# Patient Record
Sex: FEMALE | Race: WHITE | NOT HISPANIC OR LATINO | Employment: UNEMPLOYED | RURAL
[De-identification: names, ages, dates, MRNs, and addresses within clinical notes are randomized per-mention and may not be internally consistent; named-entity substitution may affect disease eponyms.]

---

## 2017-08-23 ENCOUNTER — OFFICE VISIT (OUTPATIENT)
Dept: RETAIL CLINIC | Facility: CLINIC | Age: 37
End: 2017-08-23

## 2017-08-23 VITALS
HEIGHT: 63 IN | WEIGHT: 74 LBS | OXYGEN SATURATION: 96 % | HEART RATE: 91 BPM | TEMPERATURE: 97.9 F | BODY MASS INDEX: 13.11 KG/M2 | RESPIRATION RATE: 18 BRPM

## 2017-08-23 DIAGNOSIS — J40 BRONCHITIS: Primary | ICD-10-CM

## 2017-08-23 DIAGNOSIS — J01.00 ACUTE NON-RECURRENT MAXILLARY SINUSITIS: ICD-10-CM

## 2017-08-23 PROCEDURE — 99213 OFFICE O/P EST LOW 20 MIN: CPT | Performed by: NURSE PRACTITIONER

## 2017-08-23 RX ORDER — PREDNISONE 10 MG/1
TABLET ORAL
Qty: 21 TABLET | Refills: 0 | Status: SHIPPED | OUTPATIENT
Start: 2017-08-23 | End: 2018-02-14

## 2017-08-23 RX ORDER — LEVALBUTEROL TARTRATE 45 UG/1
1-2 AEROSOL, METERED ORAL EVERY 4 HOURS PRN
Qty: 1 INHALER | Refills: 0 | Status: SHIPPED | OUTPATIENT
Start: 2017-08-23 | End: 2017-09-22

## 2017-08-23 RX ORDER — CETIRIZINE HYDROCHLORIDE 5 MG/1
5 TABLET ORAL DAILY
COMMUNITY
End: 2018-02-14

## 2017-08-23 RX ORDER — AZITHROMYCIN 250 MG/1
TABLET, FILM COATED ORAL
Qty: 6 TABLET | Refills: 0 | Status: SHIPPED | OUTPATIENT
Start: 2017-08-23 | End: 2017-11-02

## 2017-08-23 RX ORDER — LEVALBUTEROL INHALATION SOLUTION 1.25 MG/3ML
1 SOLUTION RESPIRATORY (INHALATION) EVERY 4 HOURS PRN
Qty: 60 AMPULE | Refills: 0 | Status: SHIPPED | OUTPATIENT
Start: 2017-08-23 | End: 2017-09-22

## 2017-08-23 NOTE — PROGRESS NOTES
Subjective   Leah Fuchs is a 36 y.o. female.     Sinusitis   This is a new problem. The current episode started in the past 7 days. The problem has been gradually worsening since onset. Maximum temperature: low grade. The pain is mild. Associated symptoms include chills, congestion, coughing, shortness of breath and sinus pressure. Pertinent negatives include no diaphoresis, ear pain, headaches, hoarse voice, neck pain, sneezing, sore throat or swollen glands.   Cough   Associated symptoms include chills and shortness of breath. Pertinent negatives include no ear pain, headaches or sore throat.    Pt has a history of Interstitial Restrictive Lung disease from a child, HX of multiple hospitalization.     The following portions of the patient's history were reviewed and updated as appropriate: allergies, current medications, past family history, past medical history, past social history, past surgical history and problem list.    Review of Systems   Constitutional: Positive for chills. Negative for diaphoresis.   HENT: Positive for congestion and sinus pressure. Negative for ear pain, hoarse voice, sneezing and sore throat.    Respiratory: Positive for cough and shortness of breath.    Musculoskeletal: Negative for neck pain.   Neurological: Negative for headaches.       Objective   Physical Exam   Constitutional:  Non-toxic appearance. She has a sickly appearance.   Pt severely underweight    HENT:   Head: Normocephalic and atraumatic.   Cardiovascular: Regular rhythm and normal heart sounds.    Pulmonary/Chest: Effort normal. She has wheezes. She has rhonchi. She has no rales.   Lymphadenopathy:     She has no cervical adenopathy.   Skin: Skin is warm and dry.       Assessment/Plan   Leah was seen today for sinusitis and cough.    Diagnoses and all orders for this visit:    Bronchitis    Acute non-recurrent maxillary sinusitis    Other orders  -     azithromycin (ZITHROMAX Z-LILY) 250 MG tablet; Take 2  tablets the first day, then 1 tablet daily for 4 days.  -     predniSONE (DELTASONE) 10 MG tablet; 6/5/4/3/2/1 As directed PO  -     levalbuterol (XOPENEX) 1.25 MG/3ML nebulizer solution; Take 1 ampule by nebulization Every 4 (Four) Hours As Needed for Wheezing for up to 30 days.  -     levalbuterol (XOPENEX HFA) 45 MCG/ACT inhaler; Inhale 1-2 puffs Every 4 (Four) Hours As Needed for Wheezing for up to 30 days.

## 2017-08-29 ENCOUNTER — OFFICE VISIT (OUTPATIENT)
Dept: PULMONOLOGY | Facility: CLINIC | Age: 37
End: 2017-08-29

## 2017-08-29 VITALS
SYSTOLIC BLOOD PRESSURE: 110 MMHG | BODY MASS INDEX: 14.39 KG/M2 | OXYGEN SATURATION: 92 % | WEIGHT: 78.2 LBS | HEIGHT: 62 IN | DIASTOLIC BLOOD PRESSURE: 70 MMHG | RESPIRATION RATE: 16 BRPM | TEMPERATURE: 98.6 F | HEART RATE: 121 BPM

## 2017-08-29 DIAGNOSIS — J84.9 INTERSTITIAL LUNG DISEASE (HCC): Primary | ICD-10-CM

## 2017-08-29 PROCEDURE — 99215 OFFICE O/P EST HI 40 MIN: CPT | Performed by: NURSE PRACTITIONER

## 2017-08-29 PROCEDURE — 94729 DIFFUSING CAPACITY: CPT | Performed by: NURSE PRACTITIONER

## 2017-08-29 PROCEDURE — 71020 CHG CHEST X-RAY 2 VW: CPT | Performed by: NURSE PRACTITIONER

## 2017-08-29 PROCEDURE — 94726 PLETHYSMOGRAPHY LUNG VOLUMES: CPT | Performed by: NURSE PRACTITIONER

## 2017-08-29 PROCEDURE — 94375 RESPIRATORY FLOW VOLUME LOOP: CPT | Performed by: NURSE PRACTITIONER

## 2017-08-29 PROCEDURE — 94200 LUNG FUNCTION TEST (MBC/MVV): CPT | Performed by: NURSE PRACTITIONER

## 2017-08-29 RX ORDER — AZITHROMYCIN 250 MG/1
TABLET, FILM COATED ORAL
Qty: 11 TABLET | Refills: 2 | Status: SHIPPED | OUTPATIENT
Start: 2017-08-29 | End: 2017-11-02

## 2017-08-29 RX ORDER — LEVALBUTEROL TARTRATE 45 UG/1
2 AEROSOL, METERED ORAL 4 TIMES DAILY PRN
Qty: 15 G | Refills: 11 | Status: SHIPPED | OUTPATIENT
Start: 2017-08-29 | End: 2021-12-09 | Stop reason: ALTCHOICE

## 2017-08-29 RX ORDER — PREDNISONE 20 MG/1
TABLET ORAL
Qty: 18 TABLET | Refills: 1 | Status: SHIPPED | OUTPATIENT
Start: 2017-08-29 | End: 2018-02-14

## 2017-08-29 RX ORDER — LEVOCETIRIZINE DIHYDROCHLORIDE 5 MG/1
TABLET, FILM COATED ORAL
Qty: 30 TABLET | Refills: 10 | Status: SHIPPED | OUTPATIENT
Start: 2017-08-29

## 2017-08-29 RX ORDER — KETOROLAC TROMETHAMINE 10 MG/1
TABLET, FILM COATED ORAL
Qty: 6 TABLET | Refills: 0 | Status: SHIPPED | OUTPATIENT
Start: 2017-08-29 | End: 2018-02-14

## 2017-08-29 NOTE — PROGRESS NOTES
[facility], [place], operated by [facility] Follow up    CHIEF COMPLAINT    Established new patient here for chest pain    HISTORY OF PRESENT ILLNESS    Leah Fuchs is a 36 y.o.female here today for follow-up given her history of tobacco abuse, bronchiectasis, and biopsy-proven BOOP in 2007.  I last saw her in the office 3/27/15.  At that time she was smoking a pack a cigarettes daily.  She has a history of bipolar disorder, supraventricular tachycardia, and frequent exacerbations.    She has a history of chest wall pain as well, usually in the left lung.  She was referred back to our office after she went to her local [facility], she was given prednisone and Z-Vishal for cough wheezing and congestion, the prednisone was only for about 6 days and though she feels a little bit better she hasn't fully recovered.    Also she did not have chest pains by her report and was doing well from that standpoint until she had to do breathing tests in our office today.    Over the past couple years she has had no hemoptysis, she has had frequent exacerbations and continues to smoke about a pack a day.  She has a nebulizer machine at home but is only able to use Atrovent, due to her history of tachycardia.  She is able to tolerate Xopenex HFA and this helped significantly with her breathing; albuterol has caused significant tachycardia in the past.    She has a history of pulmonary cachexia weighing as low as 82 pounds, by her report she get up to close to 100 pounds over the past year but her weight is back down again.  She denies abdominal pain or dysphagia.  No nausea vomiting.    She has nasal drainage and will use over-the-counter Claritin or Zyrtec but thinks that xyzal works the best though its expensive to purchase.    She denies hemoptysis, she's had coughing wheezing with tan sputum but it's very hard to expectorate, currently she can't really cough anything up.    She continues on oxygen at night.    Active problems:  Tobacco  abuse  Emphysema/COPD  Pulmonary cachexia  Bronchiolitis obliterans  Bipolar disorder  Bronchospasm  Allergic rhinitis    Allergies   Allergen Reactions   • Avelox [Moxifloxacin Hcl In Nacl] Shortness Of Breath     IV   • Tetracyclines & Related Shortness Of Breath   • Benadryl [Diphenhydramine] Hives   • Demerol [Meperidine] Hives   • Tessalon [Benzonatate]        Current Outpatient Prescriptions:   •  azithromycin (ZITHROMAX Z-LILY) 250 MG tablet, Take 2 tablets the first day, then 1 tablet daily for 4 days., Disp: 6 tablet, Rfl: 0  •  cetirizine (zyrTEC) 5 MG tablet, Take 5 mg by mouth Daily., Disp: , Rfl:   •  levalbuterol (XOPENEX HFA) 45 MCG/ACT inhaler, Inhale 1-2 puffs Every 4 (Four) Hours As Needed for Wheezing for up to 30 days., Disp: 1 inhaler, Rfl: 0  •  levalbuterol (XOPENEX) 1.25 MG/3ML nebulizer solution, Take 1 ampule by nebulization Every 4 (Four) Hours As Needed for Wheezing for up to 30 days., Disp: 60 ampule, Rfl: 0  •  predniSONE (DELTASONE) 10 MG tablet, 6/5/4/3/2/1 As directed PO, Disp: 21 tablet, Rfl: 0  MEDICATION LIST AND ALLERGIES REVIEWED.    Social History   Substance Use Topics   • Smoking status: Current Every Day Smoker     Types: Cigarettes   • Smokeless tobacco: Not on file   • Alcohol use Not on file       FAMILY AND SOCIAL HISTORY REVIEWED.    Review of Systems   Constitutional: Negative for chills, fatigue, fever and unexpected weight change.   HENT: Positive for congestion and postnasal drip. Negative for nosebleeds, sore throat and trouble swallowing.    Eyes: Negative.  Negative for pain and redness.   Respiratory: Positive for cough, chest tightness, shortness of breath and wheezing. Negative for stridor.    Cardiovascular: Positive for chest pain. Negative for palpitations and leg swelling.   Gastrointestinal: Negative for abdominal distention, abdominal pain and nausea.   Endocrine: Negative for cold intolerance and heat intolerance.   Genitourinary: Negative for difficulty  "urinating, dysuria, flank pain and hematuria.   Musculoskeletal: Negative for arthralgias and myalgias.   Skin: Negative for color change and rash.   Neurological: Negative for dizziness, syncope and numbness.   Hematological: Negative for adenopathy. Does not bruise/bleed easily.   Psychiatric/Behavioral: Negative for agitation, behavioral problems, confusion and sleep disturbance.   .    /70  Pulse (!) 121  Temp 98.6 °F (37 °C)  Resp 16  Ht 62\" (157.5 cm)  Wt 78 lb 3.2 oz (35.5 kg)  LMP  (LMP Unknown)  SpO2 92% Comment: RA  BMI 14.3 kg/m2  Physical Exam   Constitutional: She is oriented to person, place, and time. Vital signs are normal. She appears well-developed. She is cooperative.  Non-toxic appearance. No distress.   HENT:   Head: Normocephalic. Head is without abrasion and without contusion.   Mouth/Throat: Oropharynx is clear and moist and mucous membranes are normal.   Eyes: Conjunctivae are normal. Pupils are equal, round, and reactive to light.   Neck: Trachea normal and normal range of motion. No JVD present. No tracheal deviation present. No thyroid mass and no thyromegaly present.   Cardiovascular: Normal rate, regular rhythm and normal heart sounds.  Exam reveals no gallop.    No murmur heard.  No edema cyanosis or calf tenderness,   Pulmonary/Chest: Effort normal. No stridor. No respiratory distress. She has wheezes. She has no rales. She exhibits tenderness.   Wheezing and rhonchi throughout   Abdominal: Soft. She exhibits no ascites. There is no hepatosplenomegaly. There is no tenderness.   Lymphadenopathy:        Head (right side): No submandibular adenopathy present.        Head (left side): No submandibular adenopathy present.     She has no cervical adenopathy.        Right: No supraclavicular adenopathy present.        Left: No supraclavicular adenopathy present.   Neurological: She is alert and oriented to person, place, and time. She has normal strength.   Skin: Skin is warm " and dry. No abrasion and no rash noted.   Psychiatric: She has a normal mood and affect. Her speech is normal and behavior is normal. Cognition and memory are normal.       RESULTS    Chest x-ray PA and lateral obtained the office today reveals scattered reticular nodular markings, they appear slightly increased from 2014; though similar to film in our office in 2015.  No effusions, lungs hyperexpanded.    She really was not able to do pulmonary function testing today because of the chest wall pain    PROBLEM LIST    COPD exacerbation  --With bronchiectasis, interstitial lung disease, emphysema  --Chronic bronchitis    Chest wall pain, anterior left upper lobe    Allergic rhinitis    BOOP    Tobacco abuse    Intolerance to albuterol secondary to tachycardia  --Can only tolerate Atrovent nebulizers and Xopenex HFA      DISCUSSION    Prednisone taper for the wheezing and congestion    Azithromycin for the wheezing and congestion    Toradol 10 mg by mouth every 6 hours ×6 doses for the chest wall pain    She needs a nebulizer machine for her history of chronic bronchitis, will continue with the Atrovent nebs 4 times a day    We'll prior authorize if we need to the Xopenex inhaler as she has significant tachycardia and is unable to tolerate albuterol.  She was told to call extension [phone] if she has any difficulty getting this prior authorized    Continue on the oxygen at night    We discussed smoking cessation strategies, smoking cessation aids, and eventual long-term loss of lung function.  There were admittedly she seems completely uninterested in this    At some point we'll probably want to repeat a CT scan of the chest but we'll reassess that upon her return    Follow-up in about 6 weeks, we don't have to do pulmonary function testing at this time but I told her at some point point have to at least attempt spirometry again    Moraima Barry, DAMIAN  08/29/201711:06 AM  Electronically signed     Please note that  portions of this note were completed with a voice recognition program. Efforts were made to edit the dictations, but occasionally words are mistranscribed.      CC: Andrzej Mathew MD

## 2017-10-18 RX ORDER — PREDNISONE 20 MG/1
TABLET ORAL
Qty: 18 TABLET | OUTPATIENT
Start: 2017-10-18

## 2017-11-02 ENCOUNTER — OFFICE VISIT (OUTPATIENT)
Dept: PULMONOLOGY | Facility: CLINIC | Age: 37
End: 2017-11-02

## 2017-11-02 VITALS
HEIGHT: 62 IN | OXYGEN SATURATION: 90 % | TEMPERATURE: 98.1 F | RESPIRATION RATE: 16 BRPM | HEART RATE: 123 BPM | BODY MASS INDEX: 16.93 KG/M2 | DIASTOLIC BLOOD PRESSURE: 78 MMHG | SYSTOLIC BLOOD PRESSURE: 112 MMHG | WEIGHT: 92 LBS

## 2017-11-02 DIAGNOSIS — J47.9 BRONCHIECTASIS WITHOUT COMPLICATION (HCC): ICD-10-CM

## 2017-11-02 DIAGNOSIS — J43.1 PANLOBULAR EMPHYSEMA (HCC): ICD-10-CM

## 2017-11-02 DIAGNOSIS — J84.89 BOOP (BRONCHIOLITIS OBLITERANS WITH ORGANIZING PNEUMONIA) (HCC): ICD-10-CM

## 2017-11-02 DIAGNOSIS — J45.40 MODERATE PERSISTENT ASTHMA, UNSPECIFIED WHETHER COMPLICATED: Primary | ICD-10-CM

## 2017-11-02 DIAGNOSIS — Z72.0 TOBACCO USE: ICD-10-CM

## 2017-11-02 PROCEDURE — 99214 OFFICE O/P EST MOD 30 MIN: CPT | Performed by: NURSE PRACTITIONER

## 2017-11-02 RX ORDER — DOXEPIN HYDROCHLORIDE 150 MG/1
CAPSULE ORAL
Refills: 1 | COMMUNITY
Start: 2017-10-02 | End: 2018-02-14 | Stop reason: SDDI

## 2017-11-02 RX ORDER — LURASIDONE HYDROCHLORIDE 40 MG/1
TABLET, FILM COATED ORAL
Refills: 1 | COMMUNITY
Start: 2017-10-02 | End: 2018-02-14 | Stop reason: ALTCHOICE

## 2017-11-03 PROBLEM — Z72.0 TOBACCO USE: Status: ACTIVE | Noted: 2017-11-03

## 2017-11-03 PROBLEM — J47.9 BRONCHIECTASIS WITHOUT COMPLICATION (HCC): Status: ACTIVE | Noted: 2017-11-03

## 2017-11-03 PROBLEM — J43.1 PANLOBULAR EMPHYSEMA (HCC): Status: ACTIVE | Noted: 2017-11-03

## 2017-11-03 PROBLEM — J84.89 BOOP (BRONCHIOLITIS OBLITERANS WITH ORGANIZING PNEUMONIA): Status: ACTIVE | Noted: 2017-11-03

## 2017-11-03 PROBLEM — J45.909 ASTHMA: Status: ACTIVE | Noted: 2017-11-03

## 2017-11-03 PROBLEM — F31.9 BIPOLAR DISORDER (HCC): Status: ACTIVE | Noted: 2017-11-03

## 2017-11-03 NOTE — PROGRESS NOTES
[facility] Follow up    CHIEF COMPLAINT    COPD    HISTORY OF PRESENT ILLNESS    Leah Fuchs is a 37 y.o.female here today for follow up on her moderate persistent asthma with COPD, bronchiectasis and history of biopsy-proven blue in 2007.  She's been followed in the office by Mrs. Barry in 2015 and then once again in August 2017.  She has continued on tobacco use of more than 1 pack a day.  She has chronic recurrent exacerbations and use of prednisone and antibiotics mostly at the urgent treatment center.  She has frequent exacerbations.  She has a nebulizer machine to use his home with Atrovent as well as a Xopenex inhaler.  She is intolerant to albuterol secondary to tachycardia.  She does have oxygen at home but doesn't wear it.  In the office today she was hypoxic and tachycardic.  She also has pulmonary cachexia with her weight usually around  pounds.  She's 92 today.  She denies any nausea vomiting or abdominal pain.    She is complaining a prednisone taper currently for an exacerbation.  She is only using her Atrovent nebulizers about once a week.  She does use her Xopenex inhaler regularly.    She does follow-up with psychiatry for her bipolar disorder.  She's had some medication changes lately that she's felt has caused some tachycardia.    Patient Active Problem List   Diagnosis   • Asthma   • Bronchiectasis without complication   • BOOP (bronchiolitis obliterans with organizing pneumonia)   • Tobacco use   • Panlobular emphysema   • Bipolar disorder       Allergies   Allergen Reactions   • Avelox [Moxifloxacin Hcl In Nacl] Shortness Of Breath     IV   • Tetracyclines & Related Shortness Of Breath   • Benadryl [Diphenhydramine] Hives   • Demerol [Meperidine] Hives   • Tessalon [Benzonatate]        Current Outpatient Prescriptions:   •  cetirizine (zyrTEC) 5 MG tablet, Take 5 mg by mouth Daily., Disp: , Rfl:   •  doxepin (SINEquan) 150 MG capsule, Take 1 capsule by mouth at bedtime,  "Disp: , Rfl: 1  •  ipratropium (ATROVENT) 0.02 % nebulizer solution, 1 vial QID for sob, Disp: 30 mL, Rfl: 11  •  ketorolac (TORADOL) 10 MG tablet, Take 1 every 6 hours x 6 doses, Disp: 6 tablet, Rfl: 0  •  LATUDA 40 MG tablet tablet, Take 1 tablet by mouth once a day, Disp: , Rfl: 1  •  levalbuterol (XOPENEX HFA) 45 MCG/ACT inhaler, Inhale 2 puffs 4 (Four) Times a Day As Needed for Wheezing., Disp: 15 g, Rfl: 11  •  levocetirizine (XYZAL) 5 MG tablet, Take 1 each day, Disp: 30 tablet, Rfl: 10  •  predniSONE (DELTASONE) 10 MG tablet, 6/5/4/3/2/1 As directed PO, Disp: 21 tablet, Rfl: 0  •  predniSONE (DELTASONE) 20 MG tablet, 2 PILLS DAILY X 4 DAYS, THEN 1 DAILY X 10 DAYS, Disp: 18 tablet, Rfl: 1  MEDICATION LIST AND ALLERGIES REVIEWED.    Social History   Substance Use Topics   • Smoking status: Current Every Day Smoker     Types: Cigarettes   • Smokeless tobacco: None   • Alcohol use None       FAMILY AND SOCIAL HISTORY REVIEWED.    Review of Systems   Constitutional: Positive for fatigue. Negative for chills, fever and unexpected weight change.   HENT: Negative for congestion, nosebleeds, postnasal drip, rhinorrhea, sinus pressure and trouble swallowing.    Respiratory: Positive for cough, shortness of breath and wheezing. Negative for chest tightness.    Cardiovascular: Negative for chest pain and leg swelling.   Gastrointestinal: Negative for abdominal pain, constipation, diarrhea, nausea and vomiting.   Genitourinary: Negative for dysuria, frequency, hematuria and urgency.   Musculoskeletal: Negative for myalgias.   Neurological: Negative for dizziness, weakness, numbness and headaches.   All other systems reviewed and are negative.  .    /78  Pulse (!) 123  Temp 98.1 °F (36.7 °C)  Resp 16  Ht 62\" (157.5 cm)  Wt 92 lb (41.7 kg)  LMP  (LMP Unknown)  SpO2 90% Comment: RA  BMI 16.83 kg/m2    Physical Exam   Constitutional: She is oriented to person, place, and time. She appears well-developed and " well-nourished.   Cachectic   HENT:   Head: Normocephalic and atraumatic.   Eyes: EOM are normal. Pupils are equal, round, and reactive to light.   Neck: Normal range of motion. Neck supple.   Cardiovascular: Normal rate and regular rhythm.    No murmur heard.  Pulmonary/Chest: Effort normal. No respiratory distress. She has wheezes. She has rales.   Abdominal: Soft. Bowel sounds are normal. She exhibits no distension.   Musculoskeletal: Normal range of motion. She exhibits no edema.   Neurological: She is alert and oriented to person, place, and time.   Skin: Skin is warm and dry. No erythema.   Psychiatric: She has a normal mood and affect. Her behavior is normal.   Vitals reviewed.        RESULTS        PROBLEM LIST    Problem List Items Addressed This Visit        Respiratory    Asthma - Primary    Bronchiectasis without complication    BOOP (bronchiolitis obliterans with organizing pneumonia)    Overview     History of biopsy-proven in 2007         Panlobular emphysema       Other    Tobacco use            DISCUSSION    A total of 20 minutes was spent with face-to-face time in the office today discussing COPD management.  We spent time counseling on tobacco cessation, maintenance medication, the use of oxygen with chronic respiratory failure, patient and family education regarding disease management.  As well as the importance of compliance with medication and regular follow-up.  She is not using the Atrovent nebulizers appropriately to get a good anticholinergic effect.  I asked her if she likes to try a combination medication such as Bevespi or Stiolto.  She is agreeable to try a gave her some samples of Bevespi with a spacer in the office today.  She'll follow up in 6-8 weeks with repeat spirometry to see if it's helped her functionality.    We spent a great deal of time discussing smoking cessation.  I again reiterated that this is the only way that her functionality would improve and not continue to  decline.  Also if she would ever want to be a candidate for transplant or even evaluation she would have to stop smoking first.  We went over smoking cessation aids including patch and gum.  She is already on multiple medications for her anxiety and depression by her psychiatrist.    Overall she does not feel that her functionality has changed much over the last couple years..  I encouraged her to wear her oxygen at night and with activity throughout the day for her hypoxemia.  We went over chronic hypoxic complications including heart failure and pulmonary hypertension.    She does not get a flu shot secondary to reaction in the past.      Samina Castelan, APRN  11/02/20178:18 AM  Electronically signed     Please note that portions of this note were completed with a voice recognition program. Efforts were made to edit the dictations, but occasionally words are mistranscribed.      CC: Andrzej Mathew MD

## 2018-02-14 ENCOUNTER — OFFICE VISIT (OUTPATIENT)
Dept: PULMONOLOGY | Facility: CLINIC | Age: 38
End: 2018-02-14

## 2018-02-14 VITALS
HEART RATE: 129 BPM | HEIGHT: 62 IN | OXYGEN SATURATION: 84 % | SYSTOLIC BLOOD PRESSURE: 90 MMHG | WEIGHT: 100 LBS | BODY MASS INDEX: 18.4 KG/M2 | RESPIRATION RATE: 22 BRPM | TEMPERATURE: 97.3 F | DIASTOLIC BLOOD PRESSURE: 60 MMHG

## 2018-02-14 DIAGNOSIS — R64 PULMONARY CACHEXIA DUE TO COPD (HCC): ICD-10-CM

## 2018-02-14 DIAGNOSIS — J44.9 PULMONARY CACHEXIA DUE TO COPD (HCC): ICD-10-CM

## 2018-02-14 DIAGNOSIS — J47.9 BRONCHIECTASIS WITHOUT COMPLICATION (HCC): ICD-10-CM

## 2018-02-14 DIAGNOSIS — F31.9 BIPOLAR AFFECTIVE DISORDER, REMISSION STATUS UNSPECIFIED (HCC): ICD-10-CM

## 2018-02-14 DIAGNOSIS — J45.40 MODERATE PERSISTENT ASTHMA, UNSPECIFIED WHETHER COMPLICATED: Primary | ICD-10-CM

## 2018-02-14 DIAGNOSIS — J84.89 BOOP (BRONCHIOLITIS OBLITERANS WITH ORGANIZING PNEUMONIA) (HCC): ICD-10-CM

## 2018-02-14 DIAGNOSIS — J43.1 PANLOBULAR EMPHYSEMA (HCC): ICD-10-CM

## 2018-02-14 PROCEDURE — 99214 OFFICE O/P EST MOD 30 MIN: CPT | Performed by: NURSE PRACTITIONER

## 2018-02-14 RX ORDER — LEVOFLOXACIN 500 MG/1
500 TABLET, FILM COATED ORAL DAILY
Qty: 7 TABLET | Refills: 0 | Status: SHIPPED | OUTPATIENT
Start: 2018-02-14 | End: 2018-03-09

## 2018-02-14 RX ORDER — OLANZAPINE 5 MG/1
TABLET, ORALLY DISINTEGRATING ORAL
Refills: 1 | COMMUNITY
Start: 2018-02-05 | End: 2021-12-09 | Stop reason: ALTCHOICE

## 2018-02-14 RX ORDER — PAROXETINE 10 MG/1
TABLET, FILM COATED ORAL
Refills: 1 | COMMUNITY
Start: 2018-02-05 | End: 2018-10-26 | Stop reason: DRUGHIGH

## 2018-02-14 RX ORDER — BUDESONIDE 0.25 MG/2ML
0.25 INHALANT ORAL 2 TIMES DAILY
Qty: 10 EACH | Refills: 0 | Status: SHIPPED | OUTPATIENT
Start: 2018-02-14 | End: 2021-12-09 | Stop reason: SDUPTHER

## 2018-02-14 RX ORDER — ARIPIPRAZOLE 5 MG/1
TABLET ORAL
Refills: 1 | COMMUNITY
Start: 2017-12-27 | End: 2018-02-14 | Stop reason: SDDI

## 2018-02-14 RX ORDER — TRAZODONE HYDROCHLORIDE 50 MG/1
TABLET ORAL
Refills: 1 | COMMUNITY
Start: 2018-01-30 | End: 2019-02-26

## 2018-02-14 RX ORDER — PREDNISONE 10 MG/1
TABLET ORAL
Qty: 31 TABLET | Refills: 0 | Status: SHIPPED | OUTPATIENT
Start: 2018-02-14 | End: 2018-03-09

## 2018-02-14 NOTE — PROGRESS NOTES
[facility], [place], operated by [facility] Follow up    CHIEF COMPLAINT    Cough and congestion    HISTORY OF PRESENT ILLNESS    Leah Fuchs is a 37 y.o.female here today for worsening cough and congestion.  She's had around a week of cough and congestion that rattly but nonproductive in sputum.  She does complain of wheezing and chest tightness.  She complains of generalized malaise and fatigue with decreased appetite.  She has some nausea but no vomiting or diarrhea.  She denies any fevers or chills.  She has been using her Xopenex inhaler.  She does not use her Atrovent nebulizers.  She does have oxygen she wears at night.  She's been complaining of more more dyspnea with activity over the last year as well as monitoring her oxygen saturations at home and they do significantly drop with any activity.  She does continue to smoke 1 pack per day.    She is tried multiple other inhalers in the past including combination medications, which have all caused oral mucosa irritation and sores.  during her last visit I gave her some Bevespi be which caused again mouth sores and irritation.      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       Allergies   Allergen Reactions   • Avelox [Moxifloxacin Hcl In Nacl] Shortness Of Breath     IV   • Tetracyclines & Related Shortness Of Breath   • Benadryl [Diphenhydramine] Hives   • Demerol [Meperidine] Hives   • Tessalon [Benzonatate]        Current Outpatient Prescriptions:   •  levalbuterol (XOPENEX HFA) 45 MCG/ACT inhaler, Inhale 2 puffs 4 (Four) Times a Day As Needed for Wheezing., Disp: 15 g, Rfl: 11  •  levocetirizine (XYZAL) 5 MG tablet, Take 1 each day, Disp: 30 tablet, Rfl: 10  •  OLANZapine zydis (zyPREXA) 5 MG disintegrating tablet, Dissolve 1 tablet on tongue twice a day as needed,  "Disp: , Rfl: 1  •  PARoxetine (PAXIL) 10 MG tablet, Take 1 tablet by mouth once a day, Disp: , Rfl: 1  •  traZODone (DESYREL) 50 MG tablet, Take 1 tablet by mouth at bedtime as needed, Disp: , Rfl: 1  •  budesonide (PULMICORT) 0.25 MG/2ML nebulizer solution, Take 2 mL by nebulization 2 (Two) Times a Day., Disp: 10 each, Rfl: 0  •  levoFLOXacin (LEVAQUIN) 500 MG tablet, Take 1 tablet by mouth Daily., Disp: 7 tablet, Rfl: 0  •  predniSONE (DELTASONE) 10 MG tablet, Take 4 tabs daily x 4 days, then take 3 daily x 3 days, then take 2 daily for 2 days, then take 1 daily x 2 days, Disp: 31 tablet, Rfl: 0  MEDICATION LIST AND ALLERGIES REVIEWED.    Social History   Substance Use Topics   • Smoking status: Current Every Day Smoker     Types: Cigarettes   • Smokeless tobacco: None   • Alcohol use None       FAMILY AND SOCIAL HISTORY REVIEWED.    Review of Systems   Constitutional: Positive for activity change, appetite change and fatigue. Negative for chills, fever and unexpected weight change.   HENT: Positive for congestion. Negative for nosebleeds, postnasal drip, rhinorrhea, sinus pressure and trouble swallowing.    Respiratory: Positive for cough, chest tightness, shortness of breath and wheezing.    Cardiovascular: Negative for chest pain and leg swelling.   Gastrointestinal: Positive for nausea. Negative for abdominal pain, constipation, diarrhea and vomiting.   Genitourinary: Negative for dysuria, frequency, hematuria and urgency.   Musculoskeletal: Negative for arthralgias and myalgias.   Neurological: Negative for dizziness, weakness, numbness and headaches.   All other systems reviewed and are negative.  .    BP 90/60 (BP Location: Left arm, Patient Position: Sitting, Cuff Size: Adult)  Pulse (!) 129  Temp 97.3 °F (36.3 °C)  Resp 22  Ht 157.5 cm (62.01\")  Wt 45.4 kg (100 lb)  LMP  (LMP Unknown)  SpO2 (!) 84%  BMI 18.29 kg/m2  Wt Readings from Last 3 Encounters:   02/14/18 45.4 kg (100 lb)   11/02/17 41.7 kg " (92 lb)   08/29/17 35.5 kg (78 lb 3.2 oz)       Physical Exam   Constitutional: She is oriented to person, place, and time. She appears well-developed and well-nourished.   HENT:   Head: Normocephalic and atraumatic.   Eyes: EOM are normal. Pupils are equal, round, and reactive to light.   Neck: Normal range of motion. Neck supple.   Cardiovascular: Normal rate and regular rhythm.    No murmur heard.  Pulmonary/Chest: Effort normal. No respiratory distress. She has no wheezes. She has rales. She exhibits tenderness.   Abdominal: Soft. Bowel sounds are normal. She exhibits no distension.   Musculoskeletal: Normal range of motion. She exhibits no edema.   Neurological: She is alert and oriented to person, place, and time.   Skin: Skin is warm and dry. No erythema.   Psychiatric: She has a normal mood and affect. Her behavior is normal.   Vitals reviewed.        RESULTS    PFTS in the office today, read by me:    PROBLEM LIST    Problem List Items Addressed This Visit        Respiratory    Asthma - Primary    Relevant Medications    budesonide (PULMICORT) 0.25 MG/2ML nebulizer solution    Bronchiectasis without complication    Relevant Medications    budesonide (PULMICORT) 0.25 MG/2ML nebulizer solution    BOOP (bronchiolitis obliterans with organizing pneumonia)    Overview     History of biopsy-proven in 2007         Relevant Medications    predniSONE (DELTASONE) 10 MG tablet    budesonide (PULMICORT) 0.25 MG/2ML nebulizer solution    Panlobular emphysema    Relevant Medications    predniSONE (DELTASONE) 10 MG tablet    budesonide (PULMICORT) 0.25 MG/2ML nebulizer solution    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  We need to arrange her for continuous oxygen, she currently has oxygen only at night.  As well as a portable concentrator portable tanks to use when she is active.    She has chronic recurrent exacerbations of her bronchiectasis.  She may need suppressive therapy him and give her a course of Levaquin and prednisone currently patient and she may need to start on some chronic antibiotics or rotating antibiotics.  We will also try budesonide nebulizers twice daily due to her intolerance of MDIs and HFA's.    Encouraged smoking cessation, patient counseled for 3-10 minutes on the risks, medications and implications of long-term use including heart disease, stroke, and increased risk of cancer.  I offered behavioral therapy, social support, and medication therapies.    Follow-up in 3 months with full PFTs and chest x-ray.    I spent 25 minutes with the patient. I spent > 50% percent of this time counseling and discussing evaluation, current status, treatment options and management.    Samina Castelan, APRN  02/14/20182:08 PM  Electronically signed     Please note that portions of this note were completed with a voice recognition program. Efforts were made to edit the dictations, but occasionally words are mistranscribed.      CC: No primary care provider on file.

## 2018-03-09 ENCOUNTER — OFFICE VISIT (OUTPATIENT)
Dept: PULMONOLOGY | Facility: CLINIC | Age: 38
End: 2018-03-09

## 2018-03-09 VITALS
SYSTOLIC BLOOD PRESSURE: 94 MMHG | WEIGHT: 99.38 LBS | TEMPERATURE: 98.3 F | DIASTOLIC BLOOD PRESSURE: 70 MMHG | RESPIRATION RATE: 22 BRPM | HEART RATE: 137 BPM | OXYGEN SATURATION: 18 % | HEIGHT: 62 IN | BODY MASS INDEX: 18.29 KG/M2

## 2018-03-09 DIAGNOSIS — I82.621 ACUTE DEEP VEIN THROMBOSIS (DVT) OF RIGHT UPPER EXTREMITY, UNSPECIFIED VEIN (HCC): ICD-10-CM

## 2018-03-09 DIAGNOSIS — Z72.0 TOBACCO USE: ICD-10-CM

## 2018-03-09 DIAGNOSIS — J43.1 PANLOBULAR EMPHYSEMA (HCC): ICD-10-CM

## 2018-03-09 DIAGNOSIS — J47.9 BRONCHIECTASIS WITHOUT COMPLICATION (HCC): Primary | ICD-10-CM

## 2018-03-09 DIAGNOSIS — J42 BRONCHIOLITIS OBLITERANS (HCC): ICD-10-CM

## 2018-03-09 PROBLEM — IMO0001 BLUES: Status: ACTIVE | Noted: 2018-03-09

## 2018-03-09 PROBLEM — E78.5 DYSLIPIDEMIA: Status: ACTIVE | Noted: 2018-03-09

## 2018-03-09 PROCEDURE — 99214 OFFICE O/P EST MOD 30 MIN: CPT | Performed by: INTERNAL MEDICINE

## 2018-03-09 RX ORDER — HYDROXYZINE PAMOATE 25 MG/1
CAPSULE ORAL
Refills: 0 | COMMUNITY
Start: 2018-03-06 | End: 2018-10-26 | Stop reason: DRUGHIGH

## 2018-03-09 NOTE — PROGRESS NOTES
Subjective:     Chief Complaint:   Chief Complaint   Patient presents with   • Shortness of Breath     f/u   • Blood Clot     f/u       HPI:    Leah Fuchs is a 37 y.o. female here for follow-up of Bronchiectasis, interstitial lung disease, and COPD.    She has a history of bronchiolitis obliterans with interstitial lung disease diagnosed after open lung biopsy in 2007.  She, unfortunately, continues to smoke.  Her inhaled therapy consists of nebulized budesonide and Xopenex MDI as needed.    She was recently seen here with increasing cough and congestion.  She was recently given antibiotics and steroids.  She was found to have a right upper extremity and right neck DVT recently.  She was hospitalized at .  I don't have these records.  She has been placed on Xarelto.  Her arm swelling has reduced to normal.    Current medications are:   Current Outpatient Prescriptions:   •  budesonide (PULMICORT) 0.25 MG/2ML nebulizer solution, Take 2 mL by nebulization 2 (Two) Times a Day., Disp: 10 each, Rfl: 0  •  hydrOXYzine (VISTARIL) 25 MG capsule, TAKE ONE CAPSULE FOUR TIMES DAILY AS NEEDED FOR ANXIETY, Disp: , Rfl: 0  •  levalbuterol (XOPENEX HFA) 45 MCG/ACT inhaler, Inhale 2 puffs 4 (Four) Times a Day As Needed for Wheezing., Disp: 15 g, Rfl: 11  •  levocetirizine (XYZAL) 5 MG tablet, Take 1 each day, Disp: 30 tablet, Rfl: 10  •  OLANZapine zydis (zyPREXA) 5 MG disintegrating tablet, Dissolve 1 tablet on tongue twice a day as needed, Disp: , Rfl: 1  •  PARoxetine (PAXIL) 10 MG tablet, Take 1 tablet by mouth once a day, Disp: , Rfl: 1  •  rivaroxaban (XARELTO) 15 MG tablet, Take 15 mg by mouth 2 (Two) Times a Day With Meals., Disp: , Rfl:   •  traZODone (DESYREL) 50 MG tablet, Take 1 tablet by mouth at bedtime as needed, Disp: , Rfl: 1.      The patient's relevant past medical, surgical, family and social history were reviewed and updated in Epic as appropriate.     ROS:    Review of Systems   Constitutional:  Negative for fever.   HENT: Negative for congestion.    Respiratory: Positive for cough and shortness of breath.          Objective:    Physical Exam   Constitutional: She is oriented to person, place, and time. She appears well-developed and well-nourished.   HENT:   Head: Normocephalic and atraumatic.   Mouth/Throat: Oropharynx is clear and moist.   Neck: Neck supple. No thyromegaly present.   Cardiovascular: Normal rate and regular rhythm.  Exam reveals no gallop and no friction rub.    No murmur heard.  Pulmonary/Chest: Effort normal. No respiratory distress. She has no wheezes. She has rales.   B/L crackles   Musculoskeletal: She exhibits no edema.   Neurological: She is alert and oriented to person, place, and time.   Skin: Skin is warm and dry.   Psychiatric: She has a normal mood and affect. Her behavior is normal.   Vitals reviewed.      Diagnostics:     No additional    Assessment:    Problem List Items Addressed This Visit        Pulmonary Problems    Bronchiectasis without complication - Primary    Bronchiolitis obliterans    Overview     Description: A.  With interstitial lung disease, diagnosed after open lung biopsy in 2007.  History of biopsy-proven in 2007         Panlobular emphysema       Other    Acute deep vein thrombosis (DVT) of right upper extremity    Relevant Orders    Ambulatory Referral to Hematology / Oncology (Completed)    Tobacco use          She has chronic interstitial changes presumably related to bronchiolitis associated with pneumonia in the past.  She has evidence of emphysema and mild bronchiectasis.  She continues to smoke.  She now has developed, inexplicably, an acute DVT of the right upper extremity.    Plan:     1. Continue current inhaled and nebulized therapy  2. Continue current oxygen prescription  3. She has been whether she should see a hematologist because of her unexplained blood clots and I think that is a good idea.  She has one in mind that sees other family  members in [place] and this is fine with me  4. Continued follow-up.  Right now she sees be doing better from a pulmonary standpoint.  If she worsens in the future then thrice weekly azithromycin or rotating antibiotics could be considered.  She really needs to quit smoking.    Discussed in detail with the patient.  She will call prior to her follow up visit for any new problems.    Signed by  Renato Lakhani MD

## 2018-10-26 ENCOUNTER — OFFICE VISIT (OUTPATIENT)
Dept: PULMONOLOGY | Facility: CLINIC | Age: 38
End: 2018-10-26

## 2018-10-26 ENCOUNTER — TELEPHONE (OUTPATIENT)
Dept: PULMONOLOGY | Facility: CLINIC | Age: 38
End: 2018-10-26

## 2018-10-26 VITALS
BODY MASS INDEX: 18.08 KG/M2 | DIASTOLIC BLOOD PRESSURE: 70 MMHG | WEIGHT: 98.25 LBS | HEIGHT: 62 IN | OXYGEN SATURATION: 95 % | RESPIRATION RATE: 22 BRPM | TEMPERATURE: 97.6 F | HEART RATE: 106 BPM | SYSTOLIC BLOOD PRESSURE: 92 MMHG

## 2018-10-26 DIAGNOSIS — R64 PULMONARY CACHEXIA DUE TO COPD (HCC): ICD-10-CM

## 2018-10-26 DIAGNOSIS — Z11.4 ENCOUNTER FOR SCREENING FOR HIV: ICD-10-CM

## 2018-10-26 DIAGNOSIS — Z72.0 TOBACCO USE: ICD-10-CM

## 2018-10-26 DIAGNOSIS — J47.9 BRONCHIECTASIS WITHOUT COMPLICATION (HCC): ICD-10-CM

## 2018-10-26 DIAGNOSIS — R06.02 SHORTNESS OF BREATH: Primary | ICD-10-CM

## 2018-10-26 DIAGNOSIS — J42 BRONCHIOLITIS OBLITERANS (HCC): ICD-10-CM

## 2018-10-26 DIAGNOSIS — I27.20 PULMONARY HYPERTENSION (HCC): ICD-10-CM

## 2018-10-26 DIAGNOSIS — J44.9 PULMONARY CACHEXIA DUE TO COPD (HCC): ICD-10-CM

## 2018-10-26 LAB
ERYTHROCYTE [SEDIMENTATION RATE] IN BLOOD: 24 MM/HR (ref 0–20)
HIV1+2 AB SER QL: NORMAL

## 2018-10-26 PROCEDURE — G0432 EIA HIV-1/HIV-2 SCREEN: HCPCS | Performed by: NURSE PRACTITIONER

## 2018-10-26 PROCEDURE — 86146 BETA-2 GLYCOPROTEIN ANTIBODY: CPT | Performed by: NURSE PRACTITIONER

## 2018-10-26 PROCEDURE — 86235 NUCLEAR ANTIGEN ANTIBODY: CPT | Performed by: NURSE PRACTITIONER

## 2018-10-26 PROCEDURE — 36415 COLL VENOUS BLD VENIPUNCTURE: CPT | Performed by: NURSE PRACTITIONER

## 2018-10-26 PROCEDURE — 85652 RBC SED RATE AUTOMATED: CPT | Performed by: NURSE PRACTITIONER

## 2018-10-26 PROCEDURE — 99214 OFFICE O/P EST MOD 30 MIN: CPT | Performed by: NURSE PRACTITIONER

## 2018-10-26 PROCEDURE — 85732 THROMBOPLASTIN TIME PARTIAL: CPT | Performed by: NURSE PRACTITIONER

## 2018-10-26 PROCEDURE — 86038 ANTINUCLEAR ANTIBODIES: CPT | Performed by: NURSE PRACTITIONER

## 2018-10-26 PROCEDURE — 85597 PHOSPHOLIPID PLTLT NEUTRALIZ: CPT | Performed by: NURSE PRACTITIONER

## 2018-10-26 PROCEDURE — 86147 CARDIOLIPIN ANTIBODY EA IG: CPT | Performed by: NURSE PRACTITIONER

## 2018-10-26 PROCEDURE — 85670 THROMBIN TIME PLASMA: CPT | Performed by: NURSE PRACTITIONER

## 2018-10-26 PROCEDURE — 85613 RUSSELL VIPER VENOM DILUTED: CPT | Performed by: NURSE PRACTITIONER

## 2018-10-26 PROCEDURE — 85730 THROMBOPLASTIN TIME PARTIAL: CPT | Performed by: NURSE PRACTITIONER

## 2018-10-26 PROCEDURE — 85610 PROTHROMBIN TIME: CPT | Performed by: NURSE PRACTITIONER

## 2018-10-26 PROCEDURE — 85598 HEXAGNAL PHOSPH PLTLT NEUTRL: CPT | Performed by: NURSE PRACTITIONER

## 2018-10-26 RX ORDER — FUROSEMIDE 20 MG/1
60 TABLET ORAL DAILY
Refills: 0 | COMMUNITY
Start: 2018-10-17 | End: 2021-12-09 | Stop reason: ALTCHOICE

## 2018-10-26 RX ORDER — PANTOPRAZOLE SODIUM 40 MG/1
40 TABLET, DELAYED RELEASE ORAL DAILY
Qty: 30 TABLET | Refills: 3 | Status: SHIPPED | OUTPATIENT
Start: 2018-10-26 | End: 2019-04-04 | Stop reason: SDUPTHER

## 2018-10-26 RX ORDER — BUSPIRONE HYDROCHLORIDE 15 MG/1
15 TABLET ORAL 3 TIMES DAILY
Refills: 1 | COMMUNITY
Start: 2018-10-20 | End: 2021-12-09 | Stop reason: ALTCHOICE

## 2018-10-26 RX ORDER — HYDROXYZINE 50 MG/1
50 TABLET, FILM COATED ORAL EVERY 6 HOURS PRN
Refills: 1 | COMMUNITY
Start: 2018-10-19 | End: 2021-12-09 | Stop reason: ALTCHOICE

## 2018-10-26 RX ORDER — PREDNISONE 10 MG/1
TABLET ORAL
Qty: 30 TABLET | Refills: 0 | Status: SHIPPED | OUTPATIENT
Start: 2018-10-26 | End: 2018-11-26

## 2018-10-26 RX ORDER — MIRTAZAPINE 30 MG/1
30 TABLET, FILM COATED ORAL
Refills: 1 | COMMUNITY
Start: 2018-10-08 | End: 2022-07-28

## 2018-10-26 RX ORDER — PAROXETINE HYDROCHLORIDE 20 MG/1
20 TABLET, FILM COATED ORAL DAILY
Refills: 1 | COMMUNITY
Start: 2018-09-27 | End: 2021-12-09 | Stop reason: ALTCHOICE

## 2018-10-26 RX ORDER — AZITHROMYCIN 250 MG/1
TABLET, FILM COATED ORAL
Qty: 6 TABLET | Refills: 0 | Status: SHIPPED | OUTPATIENT
Start: 2018-10-26 | End: 2018-11-26

## 2018-10-26 NOTE — PROGRESS NOTES
"[facility]    CHIEF COMPLAINT    Shortness of breath    HISTORY OF PRESENT ILLNESS    Leah Fuchs is a 38 y.o.female here today per the request of her PCP for oxygen desaturation at home.  She has been having worsening shortness of breath with activity.  She had also noted that she had gained 17 pounds in the last couple of weeks.  She went to the ED at [facility]. She was given a diuretic and told to follow-up with her PCP.  She followed up with her PCP and he noticed her heart was enlarged from a XR and he ordered an ECHO and abdominal US.  He also wanted her to follow-up with us soon.  So she scheduled this appointment.  Her PCP also referred her to a Cardiologist and she has an appointment with Dr. Osorio on November 9th.  She was told she had \"fluid around her heart,\" and needed to see a cardiologist.      She has a history of bronchiolitis obliterans with interstitial lung disease diagnosed after open lung biopsy in 2007.  She, unfortunately, continues to smoke.  She is currently smoking 1/2 ppd.  Her inhaled therapy consists of nebulized budesonide and Xopenex MDI as needed.  She has not been using her Budesonide as frequently as she should.     She wears 3L NC continuous at home and at night, and is on 3L PD with activity.  She has had to increase to 4L PD at times due to severe dyspnea.  She has shortness of breath with activity and is weak.  She is concerned about her breathing.  She denies fever, chills, sputum production, chest pain or palpitations.  She feels poorly and just wants to breathe better.      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)   • Blues   • Dyslipidemia   • Acute deep vein thrombosis (DVT) of right upper extremity (CMS/HCC)   • Pulmonary " hypertension (CMS/HCC)       Allergies   Allergen Reactions   • Avelox [Moxifloxacin Hcl In Nacl] Shortness Of Breath     IV   • Tetracyclines & Related Shortness Of Breath   • Benadryl [Diphenhydramine] Hives   • Demerol [Meperidine] Hives   • Tessalon [Benzonatate]        Current Outpatient Prescriptions:   •  budesonide (PULMICORT) 0.25 MG/2ML nebulizer solution, Take 2 mL by nebulization 2 (Two) Times a Day., Disp: 10 each, Rfl: 0  •  busPIRone (BUSPAR) 15 MG tablet, Take 15 mg by mouth 3 (Three) Times a Day., Disp: , Rfl: 1  •  furosemide (LASIX) 20 MG tablet, Take 20 mg by mouth Every Other Day., Disp: , Rfl: 0  •  hydrOXYzine (ATARAX) 50 MG tablet, Take 50 mg by mouth Every 6 (Six) Hours As Needed., Disp: , Rfl: 1  •  levalbuterol (XOPENEX HFA) 45 MCG/ACT inhaler, Inhale 2 puffs 4 (Four) Times a Day As Needed for Wheezing., Disp: 15 g, Rfl: 11  •  levocetirizine (XYZAL) 5 MG tablet, Take 1 each day, Disp: 30 tablet, Rfl: 10  •  mirtazapine (REMERON) 30 MG tablet, Take 30 mg by mouth every night at bedtime., Disp: , Rfl: 1  •  OLANZapine zydis (zyPREXA) 5 MG disintegrating tablet, Dissolve 1 tablet on tongue twice a day as needed, Disp: , Rfl: 1  •  PARoxetine (PAXIL) 20 MG tablet, Take 20 mg by mouth Daily., Disp: , Rfl: 1  •  rivaroxaban (XARELTO) 15 MG tablet, Take 15 mg by mouth 2 (Two) Times a Day With Meals., Disp: , Rfl:   •  traZODone (DESYREL) 50 MG tablet, Take 1 tablet by mouth at bedtime as needed, Disp: , Rfl: 1  •  azithromycin (ZITHROMAX) 250 MG tablet, Take 2 by mouth today then 1 daily for 4 days, Disp: 6 tablet, Rfl: 0  •  pantoprazole (PROTONIX) 40 MG EC tablet, Take 1 tablet by mouth Daily., Disp: 30 tablet, Rfl: 3  •  predniSONE (DELTASONE) 10 MG tablet, Take 4 tabs daily x 3 days, then take 3 tabs daily x 3 days, then take 2 tabs daily x 3 days, then take 1 tab daily x 3 days, Disp: 30 tablet, Rfl: 0  MEDICATION LIST AND ALLERGIES REVIEWED.    Social History   Substance Use Topics   •  "Smoking status: Current Every Day Smoker     Types: Cigarettes   • Smokeless tobacco: Not on file   • Alcohol use Not on file       FAMILY AND SOCIAL HISTORY REVIEWED.    Review of Systems   Constitutional: Positive for fatigue. Negative for activity change, appetite change, fever and unexpected weight change.   HENT: Positive for congestion and rhinorrhea. Negative for postnasal drip, sinus pressure, sore throat and voice change.    Eyes: Negative for visual disturbance.   Respiratory: Positive for cough and shortness of breath. Negative for chest tightness and wheezing.    Cardiovascular: Positive for leg swelling. Negative for chest pain and palpitations.   Gastrointestinal: Negative for abdominal distention, abdominal pain, nausea and vomiting.   Endocrine: Negative for cold intolerance and heat intolerance.   Genitourinary: Negative for difficulty urinating and urgency.   Musculoskeletal: Negative for arthralgias, back pain and neck pain.   Skin: Negative for color change and pallor.   Allergic/Immunologic: Negative for environmental allergies and food allergies.   Neurological: Positive for weakness. Negative for dizziness, syncope and light-headedness.   Hematological: Negative for adenopathy. Does not bruise/bleed easily.   Psychiatric/Behavioral: Negative for agitation and behavioral problems.   .    BP 92/70 (BP Location: Right arm, Patient Position: Sitting, Cuff Size: Adult)   Pulse 106   Temp 97.6 °F (36.4 °C)   Resp 22   Ht 157.5 cm (62.01\")   Wt 44.6 kg (98 lb 4 oz)   LMP  (LMP Unknown)   SpO2 95%   PF (!) 3 L/min Comment: pulse  BMI 17.96 kg/m²       There is no immunization history on file for this patient.    Physical Exam   Constitutional: She is oriented to person, place, and time. She appears distressed.   Cachexia, ill appearing female in mild respiratory distress   HENT:   Head: Normocephalic and atraumatic.   Eyes: Pupils are equal, round, and reactive to light.   Neck: Normal range " of motion. Neck supple. No thyromegaly present.   Cardiovascular: Normal rate, regular rhythm and intact distal pulses.  Exam reveals no gallop and no friction rub.    Murmur heard.  Pulmonary/Chest: Effort normal and breath sounds normal. No respiratory distress. She has no wheezes. She has no rales. She exhibits no tenderness.   Mild rhonchi in upper lobes   Abdominal: Soft. Bowel sounds are normal. There is no tenderness.   Musculoskeletal: Normal range of motion.   Lymphadenopathy:     She has no cervical adenopathy.   Neurological: She is alert and oriented to person, place, and time.   Skin: Skin is warm and dry. Capillary refill takes less than 2 seconds. She is not diaphoretic.   Psychiatric: She has a normal mood and affect. Her behavior is normal.   Nursing note and vitals reviewed.        RESULTS    ECHO 10/23- Right ventricle and atrial enlargement, septal wall is flattened by the RV, severe pulmonary hypertension with RVSP @ 77, severe tricuspid regrurg,EF 35%, no pericardial effusion.    PROBLEM LIST    Problem List Items Addressed This Visit        Cardiovascular and Mediastinum    Pulmonary hypertension (CMS/HCC)    Relevant Orders    RUTHANN With / DsDNA, RNP, Sjogrens A / B, Smith    RUTHANN Screen    Anti-scleroderma Antibody    ANCA Panel    Sjogren's Antibody, Anti-SS-A / -SS-B    SPEP    Sedimentation Rate (Completed)    HIV-1 / O / 2 Ag / Antibody 4th Generation (Completed)       Respiratory    Bronchiectasis without complication (CMS/HCC)    Relevant Medications    pantoprazole (PROTONIX) 40 MG EC tablet    azithromycin (ZITHROMAX) 250 MG tablet    Other Relevant Orders    HIV-1 / O / 2 Ag / Antibody 4th Generation (Completed)    Bronchiolitis obliterans (CMS/HCC)    Overview     Description: A.  With interstitial lung disease, diagnosed after open lung biopsy in 2007.  History of biopsy-proven in 2007         Pulmonary cachexia due to COPD (CMS/HCC)    Relevant Medications    predniSONE (DELTASONE)  10 MG tablet       Other    Tobacco use      Other Visit Diagnoses     Shortness of breath    -  Primary    Relevant Medications    predniSONE (DELTASONE) 10 MG tablet    Other Relevant Orders    Lupus Anticoagulant Panel    HIV-1 / O / 2 Ag / Antibody 4th Generation (Completed)    Encounter for screening for HIV         Relevant Orders    HIV-1 / O / 2 Ag / Antibody 4th Generation (Completed)            DISCUSSION    Mrs. Fuchs was here for follow-up of her severe dyspnea.  She is ill-appearing today.  I'm not sure if she is having a bronchiectasis exacerbation or her breathing is related to her pulmonary hypertension and heart failure.  She has not been coughing up a lot of sputum but has a chronic cough.  I will order her some antibiotics and steroids and treat her for an exacerbation.  She was not able to do a 6MWT due to her dyspnea, we may try this at a later date.  She will adjust er oxygen as tolerated for comfort.    I will refer her to Dr. Charlotte STONER.  She may need to be treated for her pulmonary hypertension and possibly a RHC.  I told her we would arrange this appointment soon and call her with the date and time.  I advised her that if her breathing worsens or she has any new symptoms she needed to go to the ED.  She is agreeable.  I don't think she realizes how sick she is.  She will continue Lasix every other day by her PCP.      We discussed smoking cessation for 5 minutes.  She is trying to cut down, and is currently not interested in any medications to aid in quitting.      I will draw some labs to make sure she doesn't have an autoimmune disorder as well today.  We talked about drinking supplements to keep her weight up.         She will follow up in 1 month and we will try to get PFT's if she is able, or sooner if her symptoms worsen.    I spent 25 minutes with the patient and family. I spent > 50% percent of this time counseling and discussing diagnosis, prognosis, diagnostic testing,  evaluation, current status, treatment options and management.    Lynn WILLOUGHBY Evelio, APRN  10/26/21333:16 PM  Electronically signed     Please note that portions of this note were completed with a voice recognition program. Efforts were made to edit the dictations, but occasionally words are mistranscribed.      CC: Juliocesar Winston MD

## 2018-10-29 DIAGNOSIS — I27.20 PULMONARY HYPERTENSION (HCC): Primary | ICD-10-CM

## 2018-10-29 LAB
ANA SER QL: NEGATIVE
ENA SCL70 AB SER-ACNC: <0.2 AI (ref 0–0.9)
ENA SS-A AB SER-ACNC: <0.2 AI (ref 0–0.9)
ENA SS-B AB SER-ACNC: <0.2 AI (ref 0–0.9)

## 2018-11-04 LAB
APTT HEX PL PPP: 14 SEC
APTT IMM NP PPP: 31.2 SEC
APTT PPP 1:1 SALINE: 50.8 SEC
APTT PPP: 39.8 SEC
B2 GLYCOPROT1 IGA SER-ACNC: <10 SAU
B2 GLYCOPROT1 IGG SER-ACNC: <10 SGU
B2 GLYCOPROT1 IGM SER-ACNC: <10 SMU
CARDIOLIPIN IGG SER IA-ACNC: <10 GPL
CARDIOLIPIN IGM SER IA-ACNC: <10 MPL
CONFIRM DRVVT: 68 SEC
INR PPP: 1.4 RATIO
LAC INTERPRETATION: ABNORMAL
PLATELET NEUTRALIZATION: 0 SEC
PROTHROMBIN TIME: 15.2 SEC
SCREEN DRVVT/NORMAL: >2.5 RATIO
SCREEN DRVVT: >180 SEC
THROMBIN TIME: 17.5 SEC

## 2018-11-26 ENCOUNTER — OFFICE VISIT (OUTPATIENT)
Dept: PULMONOLOGY | Facility: CLINIC | Age: 38
End: 2018-11-26

## 2018-11-26 VITALS
DIASTOLIC BLOOD PRESSURE: 80 MMHG | HEART RATE: 96 BPM | TEMPERATURE: 97.6 F | OXYGEN SATURATION: 93 % | SYSTOLIC BLOOD PRESSURE: 108 MMHG | BODY MASS INDEX: 16.84 KG/M2 | RESPIRATION RATE: 18 BRPM | HEIGHT: 62 IN | WEIGHT: 91.5 LBS

## 2018-11-26 DIAGNOSIS — J42 BRONCHIOLITIS OBLITERANS (HCC): ICD-10-CM

## 2018-11-26 DIAGNOSIS — J44.9 PULMONARY CACHEXIA DUE TO COPD (HCC): ICD-10-CM

## 2018-11-26 DIAGNOSIS — R64 PULMONARY CACHEXIA DUE TO COPD (HCC): ICD-10-CM

## 2018-11-26 DIAGNOSIS — I27.20 PULMONARY HYPERTENSION (HCC): Primary | ICD-10-CM

## 2018-11-26 PROCEDURE — 99214 OFFICE O/P EST MOD 30 MIN: CPT | Performed by: NURSE PRACTITIONER

## 2018-11-26 RX ORDER — RIVAROXABAN 20 MG/1
20 TABLET, FILM COATED ORAL DAILY
Refills: 0 | COMMUNITY
Start: 2018-11-01

## 2018-11-26 NOTE — PROGRESS NOTES
[facility] Follow up    CHIEF COMPLAINT    Shortness of breath    HISTORY OF PRESENT ILLNESS    Leah Fuchs is a 38 y.o.female here today for shortness of breath.  Mr. Fuchs has had a very eventful month since being last seen in October.  I referred her to  on November 1st.  She was admitted at [facility] on November 6 for 1 week for worsening shortness of breath/heart failure.  While there she had a right heart catheterization per Dr. Porter.  She was discharged on daily Lasix and is being referred to the transplant team at [facility] for a heart and double lung transplant.  She sees them in December.  She quit smoking on November 3.  She is using nicotine patches and gum to aid in smoking cessation.  She was told she had to be at least 30 days smoke free to even be a candidate for transplant.    She denies fever, chills, hemoptysis, sputum production, chest pain or palpitations.  She continues to wear 3 L nasal cannula at all times.  She states she is breathing much better than at her last appointment.  She denies any current complaints.  She is anxious to be placed on the transplant list.    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)   • Blues   • Dyslipidemia   • Acute deep vein thrombosis (DVT) of right upper extremity (CMS/HCC)   • Pulmonary hypertension (CMS/HCC)       Allergies   Allergen Reactions   • Avelox [Moxifloxacin Hcl In Nacl] Shortness Of Breath     IV   • Tetracyclines & Related Shortness Of Breath   • Benadryl [Diphenhydramine] Hives   • Demerol [Meperidine] Hives   • Tessalon [Benzonatate]        Current Outpatient Medications:   •  budesonide (PULMICORT) 0.25 MG/2ML nebulizer solution, Take 2 mL by nebulization 2 (Two) Times a Day., Disp: 10  each, Rfl: 0  •  busPIRone (BUSPAR) 15 MG tablet, Take 15 mg by mouth 3 (Three) Times a Day., Disp: , Rfl: 1  •  furosemide (LASIX) 20 MG tablet, Take 20 mg by mouth Every Other Day., Disp: , Rfl: 0  •  hydrOXYzine (ATARAX) 50 MG tablet, Take 50 mg by mouth Every 6 (Six) Hours As Needed., Disp: , Rfl: 1  •  levalbuterol (XOPENEX HFA) 45 MCG/ACT inhaler, Inhale 2 puffs 4 (Four) Times a Day As Needed for Wheezing., Disp: 15 g, Rfl: 11  •  levocetirizine (XYZAL) 5 MG tablet, Take 1 each day, Disp: 30 tablet, Rfl: 10  •  mirtazapine (REMERON) 30 MG tablet, Take 30 mg by mouth every night at bedtime., Disp: , Rfl: 1  •  nicotine polacrilex (NICORETTE) 2 MG gum, chew ONE piece EVERY ONE TO TWO HOURS AS NEEDED AS DIRECTED, Disp: , Rfl: 0  •  NICOTINE STEP 1 21 MG/24HR patch, Apply one patch daily to clean, dry, nonhairy site on trunk or upper outer arm and rotate application site., Disp: , Rfl: 0  •  OLANZapine zydis (zyPREXA) 5 MG disintegrating tablet, Dissolve 1 tablet on tongue twice a day as needed, Disp: , Rfl: 1  •  pantoprazole (PROTONIX) 40 MG EC tablet, Take 1 tablet by mouth Daily., Disp: 30 tablet, Rfl: 3  •  PARoxetine (PAXIL) 20 MG tablet, Take 20 mg by mouth Daily., Disp: , Rfl: 1  •  traZODone (DESYREL) 50 MG tablet, Take 1 tablet by mouth at bedtime as needed, Disp: , Rfl: 1  •  XARELTO 20 MG tablet, Take 20 mg by mouth Daily. with food, Disp: , Rfl: 0  MEDICATION LIST AND ALLERGIES REVIEWED.    Social History     Tobacco Use   • Smoking status: Former Smoker     Types: Cigarettes     Last attempt to quit: 2018     Years since quittin.0   • Smokeless tobacco: Never Used   Substance Use Topics   • Alcohol use: No     Frequency: Never   • Drug use: No       FAMILY AND SOCIAL HISTORY REVIEWED.    Review of Systems   Constitutional: Negative for activity change, appetite change, fatigue, fever and unexpected weight change.   HENT: Negative for congestion, postnasal drip, rhinorrhea, sinus pressure,  "sore throat and voice change.    Eyes: Negative for visual disturbance.   Respiratory: Positive for cough and shortness of breath. Negative for chest tightness and wheezing.    Cardiovascular: Positive for leg swelling. Negative for chest pain and palpitations.   Gastrointestinal: Negative for abdominal distention, abdominal pain, nausea and vomiting.   Endocrine: Negative for cold intolerance and heat intolerance.   Genitourinary: Negative for difficulty urinating and urgency.   Musculoskeletal: Negative for arthralgias, back pain and neck pain.   Skin: Negative for color change and pallor.   Allergic/Immunologic: Negative for environmental allergies and food allergies.   Neurological: Negative for dizziness, syncope, weakness and light-headedness.   Hematological: Negative for adenopathy. Does not bruise/bleed easily.   Psychiatric/Behavioral: Negative for agitation and behavioral problems.   .    /80 (BP Location: Left arm, Patient Position: Sitting, Cuff Size: Adult)   Pulse 96   Temp 97.6 °F (36.4 °C)   Resp 18   Ht 157.5 cm (62.01\")   Wt 41.5 kg (91 lb 8 oz)   LMP  (LMP Unknown)   SpO2 93%   PF (!) 3 L/min Comment: pulse  BMI 16.73 kg/m²       There is no immunization history on file for this patient.    Physical Exam   Constitutional: She is oriented to person, place, and time. She appears well-developed and well-nourished.   HENT:   Head: Normocephalic and atraumatic.   Eyes: Pupils are equal, round, and reactive to light.   Neck: Normal range of motion. Neck supple. No thyromegaly present.   Cardiovascular: Normal rate, regular rhythm, normal heart sounds and intact distal pulses. Exam reveals no gallop and no friction rub.   No murmur heard.  Pulmonary/Chest: Effort normal. No respiratory distress. She has wheezes. She has no rales. She exhibits no tenderness.   Abdominal: Soft. Bowel sounds are normal. There is no tenderness.   Musculoskeletal: Normal range of motion.   Lymphadenopathy:     " She has no cervical adenopathy.   Neurological: She is alert and oriented to person, place, and time.   Skin: Skin is warm and dry. Capillary refill takes less than 2 seconds. She is not diaphoretic.   Psychiatric: She has a normal mood and affect. Her behavior is normal.   Nursing note and vitals reviewed.        RESULTS    PFTS in the office today, read by me: patient refused testing    PROBLEM LIST    Problem List Items Addressed This Visit        Cardiovascular and Mediastinum    Pulmonary hypertension (CMS/HCC) - Primary       Respiratory    Bronchiolitis obliterans (CMS/HCC)    Overview     Description: A.  With interstitial lung disease, diagnosed after open lung biopsy in 2007.  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  She appears to be in no respiratory distress today.  She looks a lot better than she did at her last visit.  We discussed the importance of smoking cessation and not starting back.  She states that she is going to quit chewing the gum because she doesn't need it anymore.    She will continue Pulmicort and Xopenex nebulizers.  She will continue to wear her oxygen at all times.  She will continue Lasix as ordered.  She will follow-up with Dr. Porter in December for her pulmonary hypertension.    She will return for a follow-up in 3 months or sooner if her symptoms worsen.  At this time there is nothing new to add to her treatment plan, and transplant is her best option for survival.  I spent 25 minutes with the patient. I spent > 50% percent of this time counseling and discussing diagnosis, prognosis, diagnostic testing, evaluation, current status, treatment options and management.    Lynn Fraser, APRN  11/26/20183:36 PM  Electronically signed     Please note that portions of this note were completed with a voice recognition program. Efforts were made to edit the dictations, but  occasionally words are mistranscribed.      CC: Juliocesar Winston MD

## 2019-02-26 ENCOUNTER — OFFICE VISIT (OUTPATIENT)
Dept: PULMONOLOGY | Facility: CLINIC | Age: 39
End: 2019-02-26

## 2019-02-26 VITALS
BODY MASS INDEX: 21.49 KG/M2 | SYSTOLIC BLOOD PRESSURE: 128 MMHG | HEIGHT: 62 IN | TEMPERATURE: 98.2 F | HEART RATE: 82 BPM | OXYGEN SATURATION: 100 % | DIASTOLIC BLOOD PRESSURE: 76 MMHG | WEIGHT: 116.8 LBS

## 2019-02-26 DIAGNOSIS — Z87.891 HISTORY OF TOBACCO ABUSE: ICD-10-CM

## 2019-02-26 DIAGNOSIS — J42 BRONCHIOLITIS OBLITERANS (HCC): ICD-10-CM

## 2019-02-26 DIAGNOSIS — I27.20 PULMONARY HYPERTENSION (HCC): Primary | ICD-10-CM

## 2019-02-26 DIAGNOSIS — J47.9 BRONCHIECTASIS WITHOUT COMPLICATION (HCC): ICD-10-CM

## 2019-02-26 PROCEDURE — 99213 OFFICE O/P EST LOW 20 MIN: CPT | Performed by: NURSE PRACTITIONER

## 2019-02-26 RX ORDER — VALSARTAN 40 MG/1
40 TABLET ORAL DAILY
Refills: 0 | COMMUNITY
Start: 2019-02-18 | End: 2021-12-09 | Stop reason: ALTCHOICE

## 2019-02-26 RX ORDER — CARVEDILOL 12.5 MG/1
3.12 TABLET ORAL 2 TIMES DAILY WITH MEALS
Refills: 5 | COMMUNITY
Start: 2018-12-27 | End: 2023-01-24

## 2019-02-26 NOTE — PROGRESS NOTES
[facility] Follow up    CHIEF COMPLAINT    Shortness of breath    HISTORY OF PRESENT ILLNESS    Miss Fuchs was here for follow-up of her shortness of breath.  She was last seen in our office in November by me.  At that time she had been worked up by 's transplant team.  She states that she has to be nicotine free for 6 months before being placed on the lung and heart transplant list.  She has been nicotine free since January.  She is hopeful to be placed on the list the summer.  She had a right heart catheter performed at  last month and her medications were changed for her blood pressure.  She sees .  She follows with him closely at .    She states that her shortness of breath has improved.  She remains on 2 L pulse dose but is able to now walk longer distances without getting winded.  She does not have to increase her oxygen at any time.  She remains on her Pulmicort and Xopenex as needed nebulizers.  She states that these are helping her breathing.  She denies fever, chills, sputum production, hemoptysis, night sweats, weight weight loss, chest pain or palpitations.  She has some mild lower extremity edema and takes Lasix daily for this.  She did have some chronic rhinorrhea but was started on Flonase and Xyzal and states that her rhinorrhea has improved.  She takes Protonix daily for GERD and denies any current reflux symptoms.    She states that she is feeling much better than she has felt the past.  She is able to do daily activities without being short of breath.  She denies any current breathing problems.    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)   • Blues   • Dyslipidemia   • Acute deep vein thrombosis (DVT) of right upper extremity (CMS/HCC)   •  Pulmonary hypertension (CMS/HCC)       Allergies   Allergen Reactions   • Avelox [Moxifloxacin Hcl In Nacl] Shortness Of Breath     IV   • Tetracyclines & Related Shortness Of Breath   • Benadryl [Diphenhydramine] Hives   • Demerol [Meperidine] Hives   • Tessalon [Benzonatate]        Current Outpatient Medications:   •  budesonide (PULMICORT) 0.25 MG/2ML nebulizer solution, Take 2 mL by nebulization 2 (Two) Times a Day., Disp: 10 each, Rfl: 0  •  busPIRone (BUSPAR) 15 MG tablet, Take 15 mg by mouth 3 (Three) Times a Day., Disp: , Rfl: 1  •  carvedilol (COREG) 12.5 MG tablet, 65.25mg PO QAM, 12.5mg PO QHS, Disp: , Rfl: 5  •  furosemide (LASIX) 20 MG tablet, Take 60 mg by mouth Daily., Disp: , Rfl: 0  •  hydrOXYzine (ATARAX) 50 MG tablet, Take 50 mg by mouth Every 6 (Six) Hours As Needed., Disp: , Rfl: 1  •  levalbuterol (XOPENEX HFA) 45 MCG/ACT inhaler, Inhale 2 puffs 4 (Four) Times a Day As Needed for Wheezing., Disp: 15 g, Rfl: 11  •  levocetirizine (XYZAL) 5 MG tablet, Take 1 each day, Disp: 30 tablet, Rfl: 10  •  mirtazapine (REMERON) 30 MG tablet, Take 30 mg by mouth every night at bedtime., Disp: , Rfl: 1  •  OLANZapine zydis (zyPREXA) 5 MG disintegrating tablet, Dissolve 1 tablet on tongue twice a day as needed, Disp: , Rfl: 1  •  pantoprazole (PROTONIX) 40 MG EC tablet, Take 1 tablet by mouth Daily., Disp: 30 tablet, Rfl: 3  •  PARoxetine (PAXIL) 20 MG tablet, Take 20 mg by mouth Daily., Disp: , Rfl: 1  •  valsartan (DIOVAN) 40 MG tablet, Take 40 mg by mouth Daily., Disp: , Rfl: 0  •  XARELTO 20 MG tablet, Take 20 mg by mouth Daily. with food, Disp: , Rfl: 0  MEDICATION LIST AND ALLERGIES REVIEWED.    Social History     Tobacco Use   • Smoking status: Former Smoker     Types: Cigarettes     Last attempt to quit: 2018     Years since quittin.2   • Smokeless tobacco: Never Used   Substance Use Topics   • Alcohol use: No     Frequency: Never   • Drug use: No       FAMILY AND SOCIAL HISTORY  "REVIEWED.    Review of Systems   Constitutional: Negative for activity change, appetite change, fatigue, fever and unexpected weight change.   HENT: Positive for rhinorrhea. Negative for congestion, postnasal drip, sinus pressure, sore throat and voice change.    Eyes: Negative for visual disturbance.   Respiratory: Positive for cough, shortness of breath and wheezing. Negative for chest tightness.    Cardiovascular: Negative for chest pain, palpitations and leg swelling.   Gastrointestinal: Negative for abdominal distention, abdominal pain, nausea and vomiting.   Endocrine: Negative for cold intolerance and heat intolerance.   Genitourinary: Negative for difficulty urinating and urgency.   Musculoskeletal: Negative for arthralgias, back pain and neck pain.   Skin: Negative for color change and pallor.   Allergic/Immunologic: Negative for environmental allergies and food allergies.   Neurological: Negative for dizziness, syncope, weakness and light-headedness.   Hematological: Negative for adenopathy. Does not bruise/bleed easily.   Psychiatric/Behavioral: Negative for agitation and behavioral problems.   .    /76   Pulse 82   Temp 98.2 °F (36.8 °C)   Ht 157.5 cm (62.01\")   Wt 53 kg (116 lb 12.8 oz)   LMP  (LMP Unknown)   SpO2 100% Comment: resting, 2 liters continuous  BMI 21.36 kg/m²       There is no immunization history on file for this patient.    Physical Exam   Constitutional: She is oriented to person, place, and time. She appears well-developed and well-nourished.   HENT:   Head: Normocephalic and atraumatic.   Eyes: Pupils are equal, round, and reactive to light.   Neck: Normal range of motion. Neck supple. No thyromegaly present.   Cardiovascular: Normal rate, regular rhythm, normal heart sounds and intact distal pulses. Exam reveals no gallop and no friction rub.   No murmur heard.  Pulmonary/Chest: Effort normal and breath sounds normal. No respiratory distress. She has no wheezes. She has " no rales. She exhibits no tenderness.   Abdominal: Soft. Bowel sounds are normal. There is no tenderness.   Musculoskeletal: Normal range of motion.   Lymphadenopathy:     She has no cervical adenopathy.   Neurological: She is alert and oriented to person, place, and time.   Skin: Skin is warm and dry. Capillary refill takes less than 2 seconds. She is not diaphoretic.   Psychiatric: She has a normal mood and affect. Her behavior is normal.   Nursing note and vitals reviewed.        RESULTS      PROBLEM LIST    Problem List Items Addressed This Visit        Cardiovascular and Mediastinum    Pulmonary hypertension (CMS/HCC) - Primary       Respiratory    Bronchiectasis without complication (CMS/HCC)    Bronchiolitis obliterans (CMS/HCC)    Overview     Description: A.  With interstitial lung disease, diagnosed after open lung biopsy in 2007.  History of biopsy-proven in 2007            Other    History of tobacco abuse            DISCUSSION    Miss Fuchs was here for follow-up of her shortness of breath.  She continues to improve with her breathing.  She is being followed closely by you case transplant team and is hopeful that she will have a new heart and lungs by the end of this year.  She should be placed on the transplant list some time in July.  She will continue her Pulmicort and Xopenex nebulizers for shortness of breath.  She will continue 2 L pulse dose of oxygen at all times.    She will continue to follow-up with Dr. Porter.      She continues to do well with her breathing currently.  We will follow closely with her while she is on the transplant list.  There is nothing more to add for her breathing currently.  She can follow-up in 6 months or sooner if her symptoms worsen.  If she has any concerns or questions she can call me in the office.    She will follow-up in 6 months or sooner if her symptoms worsen.    I spent 15 minutes with the patient. I spent > 50% percent of this time counseling and  discussing diagnosis, prognosis, diagnostic testing, evaluation, current status, treatment options and management.    Lynn CASE Fraser, DAMIAN  02/26/20191:23 PM  Electronically signed     Please note that portions of this note were completed with a voice recognition program. Efforts were made to edit the dictations, but occasionally words are mistranscribed.      CC: Juliocesar Winston MD

## 2019-04-04 ENCOUNTER — TELEPHONE (OUTPATIENT)
Dept: PULMONOLOGY | Facility: CLINIC | Age: 39
End: 2019-04-04

## 2019-04-04 DIAGNOSIS — J47.9 BRONCHIECTASIS WITHOUT COMPLICATION (HCC): ICD-10-CM

## 2019-04-04 RX ORDER — PANTOPRAZOLE SODIUM 40 MG/1
40 TABLET, DELAYED RELEASE ORAL DAILY
Qty: 30 TABLET | Refills: 3 | Status: SHIPPED | OUTPATIENT
Start: 2019-04-04 | End: 2019-12-30 | Stop reason: SDUPTHER

## 2019-07-17 ENCOUNTER — TELEPHONE (OUTPATIENT)
Dept: PULMONOLOGY | Facility: CLINIC | Age: 39
End: 2019-07-17

## 2019-07-17 NOTE — TELEPHONE ENCOUNTER
Patient called and wants POC ordered from [facility] and I faxed an order over for modality change to get POC.

## 2019-07-18 ENCOUNTER — TELEPHONE (OUTPATIENT)
Dept: PULMONOLOGY | Facility: CLINIC | Age: 39
End: 2019-07-18

## 2019-07-18 NOTE — TELEPHONE ENCOUNTER
Spoke with Katelyn at [facility] and she is sending in documents to  Insurance to try to get patients POC approved.  Patient may need to come in for a follow up appointment and 6MWT.  [facility] will let patient know if she needs to do this.

## 2019-09-24 ENCOUNTER — OFFICE VISIT (OUTPATIENT)
Dept: PULMONOLOGY | Facility: CLINIC | Age: 39
End: 2019-09-24

## 2019-09-24 VITALS
SYSTOLIC BLOOD PRESSURE: 98 MMHG | HEART RATE: 78 BPM | DIASTOLIC BLOOD PRESSURE: 78 MMHG | WEIGHT: 123.6 LBS | OXYGEN SATURATION: 95 % | HEIGHT: 62 IN | TEMPERATURE: 97.8 F | BODY MASS INDEX: 22.74 KG/M2

## 2019-09-24 DIAGNOSIS — J42 BRONCHIOLITIS OBLITERANS (HCC): ICD-10-CM

## 2019-09-24 DIAGNOSIS — I27.20 PULMONARY HYPERTENSION (HCC): ICD-10-CM

## 2019-09-24 DIAGNOSIS — J43.1 PANLOBULAR EMPHYSEMA (HCC): Primary | ICD-10-CM

## 2019-09-24 DIAGNOSIS — Z87.891 HISTORY OF TOBACCO ABUSE: ICD-10-CM

## 2019-09-24 DIAGNOSIS — J96.11 CHRONIC RESPIRATORY FAILURE WITH HYPOXIA (HCC): ICD-10-CM

## 2019-09-24 PROCEDURE — 99213 OFFICE O/P EST LOW 20 MIN: CPT | Performed by: NURSE PRACTITIONER

## 2019-09-24 NOTE — PROGRESS NOTES
[facility] Pulmonary Follow up    CHIEF COMPLAINT    Shortness of breath    HISTORY OF PRESENT ILLNESS    Leah Fuchs is a 38 y.o.female here today for follow-up of her shortness of breath.  She was last seen in the office in February by me.  She states that she is doing much better than she was at her last appointment.    She has been evaluated by Dr. Porter at  and is hopeful that within the next 6 months she will be placed on the lung transplant list.  She last saw him in August.  She had a 6-minute walk test and a PFT performed.    She continues to wear 2 to 3 L pulse dose when outside of the home and wears 2 to 3 L continuously at home.  She denies any worsening shortness of breath.  She states that her breathing has improved over the last 6 months.  She continues to use Xopenex nebulizers as needed.  She does not use these daily.    She would like to switch oxygen companies and once a POC as well.    She denies a fever, chills, sputum production, hemoptysis, night sweats, weight loss, chest pain or palpitations.  She has some mild lower extremity edema and takes daily Lasix.  She denies any sinus or allergy symptoms currently.  She is on Flonase and Xyzal daily.  She denies reflux and is currently on Protonix daily.  She has noticed more nausea over the last couple of weeks.  She has not started any new medications currently.    She quit smoking in November 2018.    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)   • Blues   • Dyslipidemia   • Acute deep vein thrombosis (DVT) of right upper extremity (CMS/HCC)   • Pulmonary hypertension (CMS/HCC)   • Chronic respiratory failure with hypoxia (CMS/HCC)       Allergies   Allergen Reactions   • Avelox [Moxifloxacin Hcl In Nacl] Shortness  Of Breath     IV   • Tetracyclines & Related Shortness Of Breath   • Benadryl [Diphenhydramine] Hives   • Demerol [Meperidine] Hives   • Tessalon [Benzonatate]        Current Outpatient Medications:   •  budesonide (PULMICORT) 0.25 MG/2ML nebulizer solution, Take 2 mL by nebulization 2 (Two) Times a Day., Disp: 10 each, Rfl: 0  •  busPIRone (BUSPAR) 15 MG tablet, Take 15 mg by mouth 3 (Three) Times a Day., Disp: , Rfl: 1  •  carvedilol (COREG) 12.5 MG tablet, 65.25mg PO QAM, 12.5mg PO QHS, Disp: , Rfl: 5  •  furosemide (LASIX) 20 MG tablet, Take 60 mg by mouth Daily., Disp: , Rfl: 0  •  hydrOXYzine (ATARAX) 50 MG tablet, Take 50 mg by mouth Every 6 (Six) Hours As Needed., Disp: , Rfl: 1  •  levalbuterol (XOPENEX HFA) 45 MCG/ACT inhaler, Inhale 2 puffs 4 (Four) Times a Day As Needed for Wheezing., Disp: 15 g, Rfl: 11  •  levocetirizine (XYZAL) 5 MG tablet, Take 1 each day, Disp: 30 tablet, Rfl: 10  •  mirtazapine (REMERON) 30 MG tablet, Take 30 mg by mouth every night at bedtime., Disp: , Rfl: 1  •  OLANZapine zydis (zyPREXA) 5 MG disintegrating tablet, Dissolve 1 tablet on tongue twice a day as needed, Disp: , Rfl: 1  •  pantoprazole (PROTONIX) 40 MG EC tablet, Take 1 tablet by mouth Daily., Disp: 30 tablet, Rfl: 3  •  PARoxetine (PAXIL) 20 MG tablet, Take 20 mg by mouth Daily., Disp: , Rfl: 1  •  valsartan (DIOVAN) 40 MG tablet, Take 40 mg by mouth Daily., Disp: , Rfl: 0  •  XARELTO 20 MG tablet, Take 20 mg by mouth Daily. with food, Disp: , Rfl: 0  MEDICATION LIST AND ALLERGIES REVIEWED.    Social History     Tobacco Use   • Smoking status: Former Smoker     Types: Cigarettes     Last attempt to quit: 2018     Years since quittin.8   • Smokeless tobacco: Never Used   Substance Use Topics   • Alcohol use: No     Frequency: Never   • Drug use: No       FAMILY AND SOCIAL HISTORY REVIEWED.    Review of Systems   Constitutional: Negative for activity change, appetite change, fatigue, fever and unexpected  "weight change.   HENT: Negative for congestion, postnasal drip, rhinorrhea, sinus pressure, sore throat and voice change.    Eyes: Negative for visual disturbance.   Respiratory: Negative for cough, chest tightness, shortness of breath and wheezing.    Cardiovascular: Negative for chest pain, palpitations and leg swelling.   Gastrointestinal: Positive for nausea. Negative for abdominal distention, abdominal pain and vomiting.   Endocrine: Negative for cold intolerance and heat intolerance.   Genitourinary: Negative for difficulty urinating and urgency.   Musculoskeletal: Negative for arthralgias, back pain and neck pain.   Skin: Negative for color change and pallor.   Allergic/Immunologic: Negative for environmental allergies and food allergies.   Neurological: Negative for dizziness, syncope, weakness and light-headedness.   Hematological: Negative for adenopathy. Does not bruise/bleed easily.   Psychiatric/Behavioral: Positive for decreased concentration and dysphoric mood. Negative for agitation and behavioral problems. The patient is nervous/anxious.    .    BP 98/78 (BP Location: Right arm, Patient Position: Sitting)   Pulse 78   Temp 97.8 °F (36.6 °C)   Ht 157.5 cm (62\")   Wt 56.1 kg (123 lb 9.6 oz)   LMP  (LMP Unknown)   SpO2 95% Comment: 2 liters  BMI 22.61 kg/m²       There is no immunization history on file for this patient.    Physical Exam   Constitutional: She is oriented to person, place, and time. She appears well-developed and well-nourished.   HENT:   Head: Normocephalic and atraumatic.   Eyes: Pupils are equal, round, and reactive to light.   Neck: Normal range of motion. Neck supple. No thyromegaly present.   Cardiovascular: Normal rate, regular rhythm, normal heart sounds and intact distal pulses. Exam reveals no gallop and no friction rub.   No murmur heard.  Pulmonary/Chest: Effort normal. No respiratory distress. She has wheezes. She has no rales. She exhibits no tenderness. "   Abdominal: Soft. Bowel sounds are normal. There is no tenderness.   Musculoskeletal: Normal range of motion.   Lymphadenopathy:     She has no cervical adenopathy.   Neurological: She is alert and oriented to person, place, and time.   Skin: Skin is warm and dry. Capillary refill takes 2 to 3 seconds. She is not diaphoretic.   Psychiatric: She has a normal mood and affect. Her behavior is normal.   Nursing note and vitals reviewed.        RESULTS      PROBLEM LIST    Problem List Items Addressed This Visit        Cardiovascular and Mediastinum    Pulmonary hypertension (CMS/HCC)       Respiratory    Bronchiolitis obliterans (CMS/HCC)    Overview     Description: A.  With interstitial lung disease, diagnosed after open lung biopsy in 2007.  History of biopsy-proven in 2007         Panlobular emphysema (CMS/HCC) - Primary    Chronic respiratory failure with hypoxia (CMS/HCC)       Other    History of tobacco abuse            DISCUSSION    Ms. Fuchs was here for follow-up of her shortness of breath.  She states that her breathing has improved since her last appointment.  She is currently being evaluated for the lung transplant at .  She is hopeful that in the next 6 months she will be placed on the list.  She will continue close follow-up with  for this.  She is currently looking much better than she has in the past.    She will continue to use her Xopenex nebulizers as needed for her shortness of breath.  She states that she has not been using these daily.  She did have some expiratory wheezes upon auscultation but states that she is breathing well today.    She would like to switch oxygen companies and is currently with [employer].  She is going to switch to [employer].  I am also going to order her a POC to use outside of the home.  I will send over the paperwork for the equipment switch.  She will continue 2-3 L NC continuously at home.       She will continue Protonix daily for GERD.  We also discussed reflux  precautions in the office today.    She will follow-up in 6 months or sooner if her symptoms worsen.  I spent 15 minutes with the patient. I spent > 50% percent of this time counseling and discussing diagnosis, prognosis, diagnostic testing, evaluation, current status, treatment options and management.    Lynn Fraser, APRN  09/24/20191:35 PM  Electronically signed     Please note that portions of this note were completed with a voice recognition program. Efforts were made to edit the dictations, but occasionally words are mistranscribed.      CC: System, Provider Not In

## 2019-09-30 ENCOUNTER — TELEPHONE (OUTPATIENT)
Dept: PULMONOLOGY | Facility: CLINIC | Age: 39
End: 2019-09-30

## 2019-09-30 NOTE — TELEPHONE ENCOUNTER
I received a call from [email or web address] [employer]. She wanted to let me know that the patient will not be ready to change DME company until March 2020. I have called the patient and told her we can change at that time.

## 2019-12-30 DIAGNOSIS — J47.9 BRONCHIECTASIS WITHOUT COMPLICATION (HCC): ICD-10-CM

## 2019-12-30 RX ORDER — PANTOPRAZOLE SODIUM 40 MG/1
40 TABLET, DELAYED RELEASE ORAL DAILY
Qty: 90 TABLET | Refills: 1 | Status: SHIPPED | OUTPATIENT
Start: 2019-12-30

## 2020-03-10 ENCOUNTER — OFFICE VISIT (OUTPATIENT)
Dept: PULMONOLOGY | Facility: CLINIC | Age: 40
End: 2020-03-10

## 2020-03-10 VITALS
HEIGHT: 62 IN | BODY MASS INDEX: 20.43 KG/M2 | DIASTOLIC BLOOD PRESSURE: 72 MMHG | HEART RATE: 87 BPM | WEIGHT: 111 LBS | SYSTOLIC BLOOD PRESSURE: 120 MMHG | OXYGEN SATURATION: 92 % | TEMPERATURE: 97.3 F

## 2020-03-10 DIAGNOSIS — I27.20 PULMONARY HYPERTENSION (HCC): ICD-10-CM

## 2020-03-10 DIAGNOSIS — F17.210 CIGARETTE NICOTINE DEPENDENCE WITHOUT COMPLICATION: ICD-10-CM

## 2020-03-10 DIAGNOSIS — J96.11 CHRONIC RESPIRATORY FAILURE WITH HYPOXIA (HCC): ICD-10-CM

## 2020-03-10 DIAGNOSIS — J47.9 BRONCHIECTASIS WITHOUT COMPLICATION (HCC): Primary | ICD-10-CM

## 2020-03-10 PROCEDURE — 94618 PULMONARY STRESS TESTING: CPT | Performed by: NURSE PRACTITIONER

## 2020-03-10 PROCEDURE — 99214 OFFICE O/P EST MOD 30 MIN: CPT | Performed by: NURSE PRACTITIONER

## 2020-03-10 RX ORDER — MELATONIN/LEMON BALM LEAF EXTR 5MG-500MCG
TABLET ORAL
COMMUNITY
Start: 2020-02-14 | End: 2021-12-09 | Stop reason: SDDI

## 2020-03-10 NOTE — PROGRESS NOTES
[facility] Pulmonary Follow up    CHIEF COMPLAINT    Pulmonary emphysema    HISTORY OF PRESENT ILLNESS    Leah Fuchs is a 39 y.o.female here today for follow-up of her pulmonary emphysema.  She was last seen in the office by me in September.  She denies any respiratory illnesses or exacerbations since her last appointment.    She continues to wear 3 L continuously at home or pulse dose when outside of the home.  She denies any worsening shortness of breath.  She does have some shortness of breath with exertion but does recover quickly at rest.  She continues to use Pulmicort nebulizers twice a day.  She also use albuterol nebulizers as needed for shortness of breath.  She states she has not used an albuterol nebulizer in quite some time.  She is wanting to switch DME companies because she cannot get a portable concentrator with her current DME company.    She denies a fever, chills, sputum production, hemoptysis, night sweats, weight loss, chest pain or palpitations.  She has some mild lower extremity edema and takes daily Lasix.  She denies any sinus or allergy symptoms currently.  She is on Flonase and Xyzal daily.  She denies reflux and is currently on Protonix daily.      She continues close follow-up with Dr. Anderson at [facility].  She was close to being placed on the  transplant team however she resumed smoking and has to be smoke-free for 6 months before she can be placed on the transplant list.    She continues to smoke fourth of a pack of cigarettes per day.  She is hopeful to stop smoking in the next month.    She refuses to receive the influenza vaccination.    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)   • Blues   • Dyslipidemia   • Acute  deep vein thrombosis (DVT) of right upper extremity (CMS/HCC)   • Pulmonary hypertension (CMS/HCC)   • Chronic respiratory failure with hypoxia (CMS/HCC)       Allergies   Allergen Reactions   • Avelox [Moxifloxacin Hcl In Nacl] Shortness Of Breath     IV   • Tetracyclines & Related Shortness Of Breath   • Benadryl [Diphenhydramine] Hives   • Demerol [Meperidine] Hives   • Tessalon [Benzonatate]        Current Outpatient Medications:   •  budesonide (PULMICORT) 0.25 MG/2ML nebulizer solution, Take 2 mL by nebulization 2 (Two) Times a Day., Disp: 10 each, Rfl: 0  •  busPIRone (BUSPAR) 15 MG tablet, Take 15 mg by mouth 3 (Three) Times a Day., Disp: , Rfl: 1  •  carvedilol (COREG) 12.5 MG tablet, 65.25mg PO QAM, 12.5mg PO QHS, Disp: , Rfl: 5  •  furosemide (LASIX) 20 MG tablet, Take 60 mg by mouth Daily., Disp: , Rfl: 0  •  levocetirizine (XYZAL) 5 MG tablet, Take 1 each day, Disp: 30 tablet, Rfl: 10  •  mirtazapine (REMERON) 30 MG tablet, Take 30 mg by mouth every night at bedtime., Disp: , Rfl: 1  •  OLANZapine zydis (zyPREXA) 5 MG disintegrating tablet, Dissolve 1 tablet on tongue twice a day as needed, Disp: , Rfl: 1  •  pantoprazole (PROTONIX) 40 MG EC tablet, Take 1 tablet by mouth Daily., Disp: 90 tablet, Rfl: 1  •  XARELTO 20 MG tablet, Take 20 mg by mouth Daily. with food, Disp: , Rfl: 0  •  hydrOXYzine (ATARAX) 50 MG tablet, Take 50 mg by mouth Every 6 (Six) Hours As Needed., Disp: , Rfl: 1  •  levalbuterol (XOPENEX HFA) 45 MCG/ACT inhaler, Inhale 2 puffs 4 (Four) Times a Day As Needed for Wheezing., Disp: 15 g, Rfl: 11  •  NICOTINE STEP 3 7 MG/24HR patch, APPLY 1 PATCH DAILY AS DIRECTED., Disp: , Rfl:   •  PARoxetine (PAXIL) 20 MG tablet, Take 20 mg by mouth Daily., Disp: , Rfl: 1  •  valsartan (DIOVAN) 40 MG tablet, Take 40 mg by mouth Daily., Disp: , Rfl: 0  MEDICATION LIST AND ALLERGIES REVIEWED.    Social History     Tobacco Use   • Smoking status: Current Every Day Smoker     Packs/day: 0.25     Years:  "15.00     Pack years: 3.75     Types: Cigarettes   • Smokeless tobacco: Never Used   Substance Use Topics   • Alcohol use: No     Frequency: Never   • Drug use: No       FAMILY AND SOCIAL HISTORY REVIEWED.    Review of Systems   Constitutional: Negative for activity change, appetite change, fatigue, fever and unexpected weight change.   HENT: Negative for congestion, postnasal drip, rhinorrhea, sinus pressure, sore throat and voice change.    Eyes: Negative for visual disturbance.   Respiratory: Positive for shortness of breath. Negative for cough, chest tightness and wheezing.    Cardiovascular: Negative for chest pain, palpitations and leg swelling.   Gastrointestinal: Negative for abdominal distention, abdominal pain, nausea and vomiting.   Endocrine: Negative for cold intolerance and heat intolerance.   Genitourinary: Negative for difficulty urinating and urgency.   Musculoskeletal: Negative for arthralgias, back pain and neck pain.   Skin: Negative for color change and pallor.   Allergic/Immunologic: Negative for environmental allergies and food allergies.   Neurological: Negative for dizziness, syncope, weakness and light-headedness.   Hematological: Negative for adenopathy. Does not bruise/bleed easily.   Psychiatric/Behavioral: Negative for agitation and behavioral problems.   .    /72 (BP Location: Left arm, Patient Position: Sitting, Cuff Size: Adult)   Pulse 87   Temp 97.3 °F (36.3 °C) (Temporal)   Ht 157.5 cm (62\")   Wt 50.3 kg (111 lb)   LMP  (LMP Unknown)   SpO2 92% Comment: 3 liters oxygen PD  BMI 20.30 kg/m²       There is no immunization history on file for this patient.    Physical Exam   Constitutional: She is oriented to person, place, and time. She appears well-developed and well-nourished.   HENT:   Head: Normocephalic and atraumatic.   Eyes: Pupils are equal, round, and reactive to light.   Neck: Normal range of motion. Neck supple. No thyromegaly present.   Cardiovascular: Normal " rate, regular rhythm, normal heart sounds and intact distal pulses. Exam reveals no gallop and no friction rub.   No murmur heard.  Pulmonary/Chest: Effort normal and breath sounds normal. No respiratory distress. She has no wheezes. She has no rales. She exhibits no tenderness.   Abdominal: Soft. Bowel sounds are normal. There is no tenderness.   Musculoskeletal: Normal range of motion. She exhibits no edema.   Lymphadenopathy:     She has no cervical adenopathy.   Neurological: She is alert and oriented to person, place, and time.   Skin: Skin is warm and dry. Capillary refill takes less than 2 seconds. She is not diaphoretic.   Psychiatric: She has a normal mood and affect. Her behavior is normal.   Nursing note and vitals reviewed.        RESULTS    6-minute walk test: Patient ambulated 800 feet and desaturated to 87%, she was placed on 2 L pulse dose and then increased to 3 L pulse dose and recovered to above 92% throughout the remainder of the testing.  She does qualify for 3 L pulse dose with activity.    PROBLEM LIST    Problem List Items Addressed This Visit        Cardiovascular and Mediastinum    Pulmonary hypertension (CMS/HCC)       Respiratory    Bronchiectasis without complication (CMS/HCC) - Primary    Relevant Orders    Walking Oximetry (Completed)    Chronic respiratory failure with hypoxia (CMS/HCC)       Other    Cigarette nicotine dependence without complication    Relevant Medications    NICOTINE STEP 3 7 MG/24HR patch            DISCUSSION    Ms. Fuchs was here for follow-up of her pulmonary emphysema.  She will continue to use Pulmicort nebulizers twice a day for her emphysema.  She will continue to use albuterol as needed for shortness of breath.  We did discuss using Mucinex for her bronchiectasis.  Currently she has been infection free for the last 6 months.    She will continue wearing at 3 L continuously for chronic respiratory failure with hypoxia.  I am going to send her information  into a new [employer] company so she can get a portable concentrator.  We will switch her over to [employer].  She will continue to wear 3 L pulse dose with activity.    We discussed smoking cessation in the office for 3 to 10 minutes.  She is not interested in any medication aids at this time.    She will follow-up in 6 months or sooner if her symptoms worsen.  She will call with any additional concerns or questions.  I spent 25 minutes with the patient. I spent > 50% percent of this time counseling and discussing diagnosis, prognosis, diagnostic testing, evaluation, current status, treatment options and management.    Lynn Fraser, APRN  03/10/061255:10 AM  Electronically signed     Please note that portions of this note were completed with a voice recognition program. Efforts were made to edit the dictations, but occasionally words are mistranscribed.      CC: Anthony Hernández MD

## 2020-09-15 ENCOUNTER — OFFICE VISIT (OUTPATIENT)
Dept: PULMONOLOGY | Facility: CLINIC | Age: 40
End: 2020-09-15

## 2020-09-15 VITALS
HEART RATE: 110 BPM | TEMPERATURE: 97.5 F | BODY MASS INDEX: 20.68 KG/M2 | OXYGEN SATURATION: 95 % | SYSTOLIC BLOOD PRESSURE: 110 MMHG | WEIGHT: 112.4 LBS | HEIGHT: 62 IN | DIASTOLIC BLOOD PRESSURE: 62 MMHG

## 2020-09-15 DIAGNOSIS — J47.9 BRONCHIECTASIS WITHOUT COMPLICATION (HCC): Primary | ICD-10-CM

## 2020-09-15 DIAGNOSIS — J43.1 PANLOBULAR EMPHYSEMA (HCC): ICD-10-CM

## 2020-09-15 DIAGNOSIS — F17.210 CIGARETTE NICOTINE DEPENDENCE WITHOUT COMPLICATION: ICD-10-CM

## 2020-09-15 DIAGNOSIS — J96.11 CHRONIC RESPIRATORY FAILURE WITH HYPOXIA (HCC): ICD-10-CM

## 2020-09-15 PROCEDURE — 99214 OFFICE O/P EST MOD 30 MIN: CPT | Performed by: NURSE PRACTITIONER

## 2020-09-15 RX ORDER — NICOTINE 10 MG
1 CARTRIDGE (EA) INHALATION AS NEEDED
Qty: 168 EACH | Refills: 3 | Status: SHIPPED | OUTPATIENT
Start: 2020-09-15 | End: 2021-12-09 | Stop reason: SDDI

## 2020-09-15 NOTE — PROGRESS NOTES
[facility] Pulmonary Follow up    CHIEF COMPLAINT    Emphysema    HISTORY OF PRESENT ILLNESS    Leah Fuchs is a 39 y.o.female here today for follow-up of her emphysema.  She was last seen in the office by me in March.  She denies any respiratory illnesses or exacerbations since her last appointment.    She continues to use her Pulmicort nebulizers twice a day.  She also has albuterol nebulizers that she will use as needed for shortness of breath.  She is still wishing to switch to a different [employer] company so she can get a portable concentrator.  Currently she is unable to get a POC.    She continues to wear 3 L continuously at home her pulse dose when outside of the home.  She does have some shortness of breath with exertion but does recover quickly at rest.    She denies a fever, chills, sputum production, hemoptysis, night sweats, weight loss, chest pain or palpitations.  She has some mild lower extremity edema and takes daily Lasix.  She denies any sinus or allergy symptoms currently.  She is on Flonase and Xyzal daily.  She denies reflux and is currently on Protonix daily.       She continues close follow-up with Dr. Anderson at [facility].  She was close to being placed on the  transplant team however she resumed smoking and has to be smoke-free for 6 months before she can be placed on the transplant list.     She continues to smoke 1 pack of cigarettes per day.  She is trying to cut back.     She refuses to receive the influenza vaccination.     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)   • Blues   • Dyslipidemia   • Acute deep vein thrombosis (DVT) of right upper extremity (CMS/HCC)   • Pulmonary hypertension (CMS/HCC)   • Chronic respiratory  failure with hypoxia (CMS/HCC)       Allergies   Allergen Reactions   • Avelox [Moxifloxacin Hcl In Nacl] Shortness Of Breath     IV   • Tetracyclines & Related Shortness Of Breath   • Benadryl [Diphenhydramine] Hives   • Demerol [Meperidine] Hives   • Tessalon [Benzonatate]        Current Outpatient Medications:   •  budesonide (PULMICORT) 0.25 MG/2ML nebulizer solution, Take 2 mL by nebulization 2 (Two) Times a Day., Disp: 10 each, Rfl: 0  •  busPIRone (BUSPAR) 15 MG tablet, Take 15 mg by mouth 3 (Three) Times a Day., Disp: , Rfl: 1  •  carvedilol (COREG) 12.5 MG tablet, 65.25mg PO QAM, 12.5mg PO QHS, Disp: , Rfl: 5  •  furosemide (LASIX) 20 MG tablet, Take 60 mg by mouth Daily., Disp: , Rfl: 0  •  hydrOXYzine (ATARAX) 50 MG tablet, Take 50 mg by mouth Every 6 (Six) Hours As Needed., Disp: , Rfl: 1  •  levalbuterol (XOPENEX HFA) 45 MCG/ACT inhaler, Inhale 2 puffs 4 (Four) Times a Day As Needed for Wheezing., Disp: 15 g, Rfl: 11  •  levocetirizine (XYZAL) 5 MG tablet, Take 1 each day, Disp: 30 tablet, Rfl: 10  •  mirtazapine (REMERON) 30 MG tablet, Take 30 mg by mouth every night at bedtime., Disp: , Rfl: 1  •  NICOTINE STEP 3 7 MG/24HR patch, APPLY 1 PATCH DAILY AS DIRECTED., Disp: , Rfl:   •  OLANZapine zydis (zyPREXA) 5 MG disintegrating tablet, Dissolve 1 tablet on tongue twice a day as needed, Disp: , Rfl: 1  •  pantoprazole (PROTONIX) 40 MG EC tablet, Take 1 tablet by mouth Daily., Disp: 90 tablet, Rfl: 1  •  PARoxetine (PAXIL) 20 MG tablet, Take 20 mg by mouth Daily., Disp: , Rfl: 1  •  valsartan (DIOVAN) 40 MG tablet, Take 40 mg by mouth Daily., Disp: , Rfl: 0  •  XARELTO 20 MG tablet, Take 20 mg by mouth Daily. with food, Disp: , Rfl: 0  •  nicotine (Nicotrol) 10 MG inhaler, Inhale 1 puff As Needed for Smoking Cessation., Disp: 168 each, Rfl: 3  MEDICATION LIST AND ALLERGIES REVIEWED.    Social History     Tobacco Use   • Smoking status: Current Every Day Smoker     Packs/day: 1.00     Years: 15.00      "Pack years: 15.00     Types: Cigarettes   • Smokeless tobacco: Never Used   Substance Use Topics   • Alcohol use: No     Frequency: Never   • Drug use: No       FAMILY AND SOCIAL HISTORY REVIEWED.    Review of Systems   Constitutional: Negative for activity change, appetite change, fatigue, fever and unexpected weight change.   HENT: Negative for congestion, postnasal drip, rhinorrhea, sinus pressure, sore throat and voice change.    Eyes: Negative for visual disturbance.   Respiratory: Positive for shortness of breath. Negative for cough, chest tightness and wheezing.    Cardiovascular: Negative for chest pain, palpitations and leg swelling.   Gastrointestinal: Negative for abdominal distention, abdominal pain, nausea and vomiting.   Endocrine: Negative for cold intolerance and heat intolerance.   Genitourinary: Negative for difficulty urinating and urgency.   Musculoskeletal: Negative for arthralgias, back pain and neck pain.   Skin: Negative for color change and pallor.   Allergic/Immunologic: Negative for environmental allergies and food allergies.   Neurological: Negative for dizziness, syncope, weakness and light-headedness.   Hematological: Negative for adenopathy. Does not bruise/bleed easily.   Psychiatric/Behavioral: Negative for agitation and behavioral problems.   .    /62 (BP Location: Right arm, Patient Position: Sitting, Cuff Size: Adult)   Pulse 110   Temp 97.5 °F (36.4 °C) (Temporal)   Ht 157.5 cm (62\")   Wt 51 kg (112 lb 6.4 oz)   LMP  (LMP Unknown)   SpO2 95% Comment: 3 liters at rest  BMI 20.56 kg/m²       There is no immunization history on file for this patient.    Physical Exam  Vitals signs and nursing note reviewed.   Constitutional:       Appearance: She is well-developed. She is not diaphoretic.   HENT:      Head: Normocephalic and atraumatic.   Eyes:      Pupils: Pupils are equal, round, and reactive to light.   Neck:      Musculoskeletal: Normal range of motion and neck " supple.      Thyroid: No thyromegaly.   Cardiovascular:      Rate and Rhythm: Normal rate and regular rhythm.      Heart sounds: Murmur present. No friction rub. No gallop.    Pulmonary:      Effort: Pulmonary effort is normal. No respiratory distress.      Breath sounds: Normal breath sounds. No wheezing or rales.      Comments: Rhonchi in upper lobes  Chest:      Chest wall: No tenderness.   Abdominal:      General: Bowel sounds are normal.      Palpations: Abdomen is soft.      Tenderness: There is no abdominal tenderness.   Musculoskeletal: Normal range of motion.         General: No swelling.   Lymphadenopathy:      Cervical: No cervical adenopathy.   Skin:     General: Skin is warm and dry.      Capillary Refill: Capillary refill takes less than 2 seconds.   Neurological:      Mental Status: She is alert and oriented to person, place, and time.   Psychiatric:         Behavior: Behavior normal.           RESULTS    Xr Chest Pa & Lateral    Result Date: 9/16/2020  Mild progression of chronic changes from 2017 exam including central bronchiectatic changes and scarring without acute parenchymal process development or effusion.  D:  09/15/2020 E:  09/16/2020  This report was finalized on 9/16/2020 5:12 PM by Dr. Efren Carrasquillo.      6MWT: Patient ambulated 1600 feet and desaturated to 80% was placed on 2 L pulse dose, she increased to 3 L and her saturations went to 94% she remained on 3 L pulse dose throughout the entire walk test.  She does meet qualifications for 3 L pulse dose with activity    PROBLEM LIST    Problem List Items Addressed This Visit        Respiratory    Bronchiectasis without complication (CMS/HCC) - Primary    Relevant Orders    XR Chest PA & Lateral    Panlobular emphysema (CMS/HCC)    Chronic respiratory failure with hypoxia (CMS/HCC)       Other    Cigarette nicotine dependence without complication    Relevant Medications    nicotine (Nicotrol) 10 MG inhaler            DISCUSSION    Ms.  Shila was here for follow-up of her emphysema.  She seems to be doing fairly well from a pulmonary standpoint.  She has not had no exacerbations in the last 6 months.  She will remain on Pulmicort nebulizers twice a day for her emphysema.  Did encourage her to use her albuterol as needed for shortness of breath.    We did review her chest x-ray in the office today and appears to have no acute pulmonary process and similar to her previous testings however the official report is pending.  I will notify the patient if there is any abnormalities.    She will remain on 3 L continuous for her chronic respiratory failure with hypoxia.  I did advise her that I would contact her DME company and see if we can switch her over so she can get a POC.  She was told that she could not switch DME companies until 2022.    She will continue close follow-up with Dr. Anderson for her possible lung transplant.  I did encourage her to quit smoking so she could be placed on the transplant list.    We discussed smoking cessation in the office for 3 to 10 minutes.  She is interested in the Nicotrol inhaler and I will call this in for her today.  I did encourage her to complete smoking cessation.  She is going to try.    She will follow-up in 6 months or sooner if her symptoms worsen.  She will call with any additional concerns or questions.  I spent 25 minutes with the patient. I spent > 50% percent of this time counseling and discussing diagnosis, prognosis, diagnostic testing, evaluation, current status, treatment options and management.    Lynn Fraser, DAMIAN  09/15/[id number] EDT  Electronically signed     Please note that portions of this note were completed with a voice recognition program. Efforts were made to edit the dictations, but occasionally words are mistranscribed.      CC: Anthony Hernández MD

## 2020-09-17 ENCOUNTER — TELEPHONE (OUTPATIENT)
Dept: PULMONOLOGY | Facility: CLINIC | Age: 40
End: 2020-09-17

## 2020-09-17 NOTE — TELEPHONE ENCOUNTER
Daniel needs the patient to come in and do a 6MWT so they can start her on Oxygen. I have called the patient to let her know and will let NOREEN to addend her note after the walk is complete.

## 2020-09-22 ENCOUNTER — OFFICE VISIT (OUTPATIENT)
Dept: PULMONOLOGY | Facility: CLINIC | Age: 40
End: 2020-09-22

## 2020-09-22 ENCOUNTER — CLINICAL SUPPORT (OUTPATIENT)
Dept: PULMONOLOGY | Facility: CLINIC | Age: 40
End: 2020-09-22

## 2020-09-22 DIAGNOSIS — J47.9 BRONCHIECTASIS WITHOUT COMPLICATION (HCC): Primary | ICD-10-CM

## 2020-09-22 PROCEDURE — 94618 PULMONARY STRESS TESTING: CPT | Performed by: NURSE PRACTITIONER

## 2021-12-06 DIAGNOSIS — Z01.812 BLOOD TESTS PRIOR TO TREATMENT OR PROCEDURE: Primary | ICD-10-CM

## 2021-12-07 ENCOUNTER — CLINICAL SUPPORT NO REQUIREMENTS (OUTPATIENT)
Dept: PULMONOLOGY | Facility: CLINIC | Age: 41
End: 2021-12-07

## 2021-12-07 DIAGNOSIS — Z01.812 BLOOD TESTS PRIOR TO TREATMENT OR PROCEDURE: ICD-10-CM

## 2021-12-07 PROCEDURE — 99211 OFF/OP EST MAY X REQ PHY/QHP: CPT | Performed by: NURSE PRACTITIONER

## 2021-12-07 PROCEDURE — U0005 INFEC AGEN DETEC AMPLI PROBE: HCPCS | Performed by: NURSE PRACTITIONER

## 2021-12-07 PROCEDURE — U0004 COV-19 TEST NON-CDC HGH THRU: HCPCS | Performed by: NURSE PRACTITIONER

## 2021-12-08 LAB — SARS-COV-2 RNA NOSE QL NAA+PROBE: NOT DETECTED

## 2021-12-09 ENCOUNTER — OFFICE VISIT (OUTPATIENT)
Dept: PULMONOLOGY | Facility: CLINIC | Age: 41
End: 2021-12-09

## 2021-12-09 VITALS
BODY MASS INDEX: 19.43 KG/M2 | WEIGHT: 105.6 LBS | TEMPERATURE: 97 F | HEIGHT: 62 IN | HEART RATE: 80 BPM | DIASTOLIC BLOOD PRESSURE: 68 MMHG | OXYGEN SATURATION: 100 % | SYSTOLIC BLOOD PRESSURE: 118 MMHG

## 2021-12-09 DIAGNOSIS — I27.20 PULMONARY HYPERTENSION (HCC): ICD-10-CM

## 2021-12-09 DIAGNOSIS — J43.1 PANLOBULAR EMPHYSEMA (HCC): Primary | ICD-10-CM

## 2021-12-09 DIAGNOSIS — J96.11 CHRONIC RESPIRATORY FAILURE WITH HYPOXIA (HCC): ICD-10-CM

## 2021-12-09 DIAGNOSIS — F17.210 CIGARETTE NICOTINE DEPENDENCE WITHOUT COMPLICATION: ICD-10-CM

## 2021-12-09 DIAGNOSIS — J42 BRONCHIOLITIS OBLITERANS (HCC): ICD-10-CM

## 2021-12-09 PROCEDURE — 99214 OFFICE O/P EST MOD 30 MIN: CPT | Performed by: NURSE PRACTITIONER

## 2021-12-09 PROCEDURE — 94729 DIFFUSING CAPACITY: CPT | Performed by: NURSE PRACTITIONER

## 2021-12-09 PROCEDURE — 94375 RESPIRATORY FLOW VOLUME LOOP: CPT | Performed by: NURSE PRACTITIONER

## 2021-12-09 PROCEDURE — 94726 PLETHYSMOGRAPHY LUNG VOLUMES: CPT | Performed by: NURSE PRACTITIONER

## 2021-12-09 RX ORDER — VENLAFAXINE HYDROCHLORIDE 150 MG/1
150 CAPSULE, EXTENDED RELEASE ORAL DAILY
COMMUNITY
End: 2023-01-24

## 2021-12-09 RX ORDER — ATORVASTATIN CALCIUM 20 MG/1
20 TABLET, FILM COATED ORAL DAILY
COMMUNITY

## 2021-12-09 RX ORDER — MEGESTROL ACETATE 40 MG/1
40 TABLET ORAL DAILY
COMMUNITY

## 2021-12-09 RX ORDER — QUETIAPINE 200 MG/1
200 TABLET, FILM COATED, EXTENDED RELEASE ORAL NIGHTLY
COMMUNITY
End: 2022-07-28

## 2021-12-09 RX ORDER — BUDESONIDE 0.25 MG/2ML
0.25 INHALANT ORAL 2 TIMES DAILY
Qty: 60 ML | Refills: 11 | Status: SHIPPED | OUTPATIENT
Start: 2021-12-09

## 2021-12-09 NOTE — PROGRESS NOTES
[facility] Pulmonary Follow up    CHIEF COMPLAINT    Dyspnea    HISTORY OF PRESENT ILLNESS    Leah Fuchs is a 41 y.o.female here today for follow-up.  She was last seen in the office by me in September 2020.  She was hospitalized at  for severe dehydration and Warnicke encephalopathy.  Her weight got as low as 87 pounds.  Her symptoms improved and she was discharged home with her mother.  She continues to have difficulty with her memory.  She is currently residing with her mother but keeps her apartment and hopes to return to living by herself in the near future.    She is currently using Dulera 200, Anoro and Incruse daily.  She has budesonide nebulizers but has not been using them.  She also has albuterol rescue inhaler to use as needed for shortness of breath.  She is trying to be more active at home.    She continues to wear 2 to 3 L continuously at home.  She has currently been on 2 L pulse dose in the office and has done well with ambulation.  She denies any sleeping difficulties.    She continues to follow with Dr. Anderson's office and has an appointment with him later today.    Unfortunately she continues to smoke 1 pack/day.  She had quit for almost a year but started back and has not planned a stop date.  She cannot be placed on the transplant list until she completely quit smoking.    She denies fever, chills, sputum production, hemoptysis, night sweats, weight loss, chest pain or palpitations.  She denies any lower extremity edema or calf tenderness.  She denies any sinus or allergy symptoms.  She denies reflux symptoms.  She has been able to put on some additional weight since her hospitalization in July.    She is up-to-date on her current vaccinations.  She is accompanied today by her mother.    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 (HCC)   • Blues   • Dyslipidemia   • Acute deep vein thrombosis (DVT) of right upper extremity (HCC)   • Pulmonary hypertension (HCC)   • Chronic respiratory failure with hypoxia (HCC)       Allergies   Allergen Reactions   • Avelox [Moxifloxacin Hcl In Nacl] Shortness Of Breath     IV   • Tetracyclines & Related Shortness Of Breath   • Benadryl [Diphenhydramine] Hives   • Demerol [Meperidine] Hives   • Tessalon [Benzonatate]        Current Outpatient Medications:   •  atorvastatin (LIPITOR) 20 MG tablet, Take 20 mg by mouth Daily., Disp: , Rfl:   •  budesonide (Pulmicort) 0.25 MG/2ML nebulizer solution, Take 2 mL by nebulization 2 (Two) Times a Day., Disp: 60 mL, Rfl: 11  •  carvedilol (COREG) 12.5 MG tablet, 12.5 mg., Disp: , Rfl: 5  •  levocetirizine (XYZAL) 5 MG tablet, Take 1 each day, Disp: 30 tablet, Rfl: 10  •  megestrol (MEGACE) 40 MG tablet, Take 40 mg by mouth Daily., Disp: , Rfl:   •  mirtazapine (REMERON) 30 MG tablet, Take 30 mg by mouth every night at bedtime., Disp: , Rfl: 1  •  mometasone-formoterol (DULERA 200) 200-5 MCG/ACT inhaler, Inhale 2 puffs 2 (Two) Times a Day for 266 days., Disp: 39 g, Rfl: 3  •  pantoprazole (PROTONIX) 40 MG EC tablet, Take 1 tablet by mouth Daily., Disp: 90 tablet, Rfl: 1  •  QUEtiapine XR (SEROquel XR) 200 MG 24 hr tablet, Take 200 mg by mouth Every Night., Disp: , Rfl:   •  Umeclidinium Bromide (Incruse Ellipta) 62.5 MCG/INH aerosol powder , Inhale 1 puff Daily., Disp: 30 each, Rfl: 11  •  venlafaxine XR (EFFEXOR-XR) 150 MG 24 hr capsule, Take 150 mg by mouth Daily., Disp: , Rfl:   •  XARELTO 20 MG tablet, Take 20 mg by mouth Daily. with food, Disp: , Rfl: 0  MEDICATION LIST AND ALLERGIES REVIEWED.    Social History     Tobacco Use   • Smoking status: Current Every Day Smoker     Packs/day: 1.00     Years: 15.00     Pack years: 15.00     Types: Cigarettes   • Smokeless tobacco: Never Used   Substance  "Use Topics   • Alcohol use: No   • Drug use: No       FAMILY AND SOCIAL HISTORY REVIEWED.    Review of Systems   Constitutional: Positive for fatigue. Negative for activity change, appetite change, fever and unexpected weight change.   HENT: Negative for congestion, postnasal drip, rhinorrhea, sinus pressure, sore throat and voice change.    Eyes: Negative for visual disturbance.   Respiratory: Positive for shortness of breath. Negative for cough, chest tightness and wheezing.    Cardiovascular: Negative for chest pain, palpitations and leg swelling.   Gastrointestinal: Negative for abdominal distention, abdominal pain, nausea and vomiting.   Endocrine: Negative for cold intolerance and heat intolerance.   Genitourinary: Negative for difficulty urinating and urgency.   Musculoskeletal: Negative for arthralgias, back pain and neck pain.   Skin: Negative for color change and pallor.   Allergic/Immunologic: Negative for environmental allergies and food allergies.   Neurological: Negative for dizziness, syncope, weakness and light-headedness.        Memory loss   Hematological: Negative for adenopathy. Does not bruise/bleed easily.   Psychiatric/Behavioral: Negative for agitation, behavioral problems and decreased concentration.   .    /68   Pulse 80   Temp 97 °F (36.1 °C)   Ht 157.5 cm (62\")   Wt 47.9 kg (105 lb 9.6 oz)   LMP  (LMP Unknown)   SpO2 100% Comment: resting, 2 liters pulse dose  Breastfeeding No   BMI 19.31 kg/m²       There is no immunization history on file for this patient.    Physical Exam  Vitals reviewed.   Constitutional:       Appearance: She is well-developed.   HENT:      Head: Normocephalic and atraumatic.   Eyes:      Pupils: Pupils are equal, round, and reactive to light.   Cardiovascular:      Rate and Rhythm: Normal rate and regular rhythm.      Heart sounds: Normal heart sounds.   Pulmonary:      Effort: Pulmonary effort is normal.      Breath sounds: Normal breath sounds. No " wheezing or rales.   Chest:      Chest wall: No tenderness.   Abdominal:      General: Bowel sounds are normal.      Palpations: Abdomen is soft.   Musculoskeletal:         General: No swelling. Normal range of motion.      Cervical back: Normal range of motion and neck supple.   Skin:     General: Skin is warm and dry.      Capillary Refill: Capillary refill takes less than 2 seconds.   Neurological:      Mental Status: She is alert and oriented to person, place, and time.   Psychiatric:         Mood and Affect: Mood normal.         Behavior: Behavior normal.           RESULTS    PFTS in the office today, read by me, FVC 1.09 32% predicted, FEV1 0.79 28% predicted, FEV1/FVC 73% predicted, TLC 6.02 130% predicted, DLCO 31% predicted, severe obstruction, no restriction and severe diffusion.    Chest PA/lateral: Official report pending    PROBLEM LIST    Problem List Items Addressed This Visit        Pulmonary and Pneumonias    Bronchiolitis obliterans (HCC)    Overview     Description: A.  With interstitial lung disease, diagnosed after open lung biopsy in 2007.  History of biopsy-proven in 2007         Relevant Medications    mometasone-formoterol (DULERA 200) 200-5 MCG/ACT inhaler    Umeclidinium Bromide (Incruse Ellipta) 62.5 MCG/INH aerosol powder     budesonide (Pulmicort) 0.25 MG/2ML nebulizer solution    Panlobular emphysema (HCC) - Primary    Relevant Medications    mometasone-formoterol (DULERA 200) 200-5 MCG/ACT inhaler    Umeclidinium Bromide (Incruse Ellipta) 62.5 MCG/INH aerosol powder     budesonide (Pulmicort) 0.25 MG/2ML nebulizer solution    Other Relevant Orders    XR Chest PA & Lateral    Pulmonary Function Test (Completed)    Pulmonary hypertension (HCC)    Chronic respiratory failure with hypoxia (HCC)       Tobacco    Cigarette nicotine dependence without complication            DISCUSSION    Ms. Fuchs was here for follow-up of her emphysema.  She had a difficult summer and had lost weight due  to severe dehydration and malnutrition.  She is currently residing with her mother and is done better over the last several months.  Her weight is back up to 105 from as low as 87 pounds over the summer.    She is currently on Dulera, Anoro and Incruse.  I did advise her that she cannot use the Dulera and Anoro together.  She is going to continue Dulera 200 twice a day, Incruse daily and discontinue the Anoro.  I did advise her that if she notices worsening shortness of breath she can add the budesonide nebulizers twice a day back to her regimen.  I did call in refills on all these medications today.    We spent 4 minutes discussing smoking cessation in the office today.  She has tried multiple medication aids in the past but has been unsuccessful in quitting.  She is considering a smoking cessation class in the future.    We reviewed her full PFTs in the office today and she continues to have severe obstruction.  We also reviewed her chest x-ray in the office today and the official report is pending.  I will call her if there are any abnormalities.    She will remain on 2 to 3 L continuously at home for her chronic respiratory failure.    She will follow-up in 6 months or sooner if her symptoms worsen.  I did advise her to call with any additional concerns or questions.    Level of service justified based on 36 minutes spent in patient care on this date of service including, but not limited to: preparing to see the patient, obtaining and/or reviewing history, performing medically appropriate examination, ordering tests/medicine/procedures, independently interpreting results, documenting clinical information in EHR, and counseling/education of patient/family/caregiver. (Level 4 30-39 minutes; Level 5 40-54 minutes)      DAMIAN Rouse  12/09/202110:09 EST  Electronically signed     Please note that portions of this note were completed with a voice recognition program.        CC: Anthony Hernández MD

## 2021-12-15 ENCOUNTER — DOCUMENTATION (OUTPATIENT)
Dept: PULMONOLOGY | Facility: CLINIC | Age: 41
End: 2021-12-15

## 2021-12-15 NOTE — PROGRESS NOTES
Order for sent to DME, but patient wants to get Budesonide from her local pharmacy.  Given to MA's to send to pharmacy.

## 2022-07-28 ENCOUNTER — OFFICE VISIT (OUTPATIENT)
Dept: PULMONOLOGY | Facility: CLINIC | Age: 42
End: 2022-07-28

## 2022-07-28 ENCOUNTER — TELEPHONE (OUTPATIENT)
Dept: PULMONOLOGY | Facility: CLINIC | Age: 42
End: 2022-07-28

## 2022-07-28 VITALS
WEIGHT: 110 LBS | BODY MASS INDEX: 20.24 KG/M2 | SYSTOLIC BLOOD PRESSURE: 122 MMHG | TEMPERATURE: 97.8 F | OXYGEN SATURATION: 98 % | DIASTOLIC BLOOD PRESSURE: 72 MMHG | HEIGHT: 62 IN | HEART RATE: 74 BPM

## 2022-07-28 DIAGNOSIS — J96.11 CHRONIC RESPIRATORY FAILURE WITH HYPOXIA: ICD-10-CM

## 2022-07-28 DIAGNOSIS — F17.210 CIGARETTE NICOTINE DEPENDENCE WITHOUT COMPLICATION: ICD-10-CM

## 2022-07-28 DIAGNOSIS — J43.1 PANLOBULAR EMPHYSEMA: Primary | ICD-10-CM

## 2022-07-28 DIAGNOSIS — I27.20 PULMONARY HYPERTENSION: ICD-10-CM

## 2022-07-28 PROCEDURE — 99214 OFFICE O/P EST MOD 30 MIN: CPT | Performed by: NURSE PRACTITIONER

## 2022-07-28 RX ORDER — SERTRALINE HYDROCHLORIDE 100 MG/1
TABLET, FILM COATED ORAL
COMMUNITY
Start: 2022-06-29

## 2022-07-28 RX ORDER — ARIPIPRAZOLE 2 MG/1
TABLET ORAL
COMMUNITY
Start: 2022-06-29

## 2022-07-28 RX ORDER — GAUZE BANDAGE 2" X 2"
1 BANDAGE TOPICAL EVERY OTHER DAY
COMMUNITY
Start: 2022-07-25

## 2022-07-28 RX ORDER — METHOCARBAMOL 500 MG/1
500 TABLET, FILM COATED ORAL EVERY 6 HOURS PRN
COMMUNITY
Start: 2022-05-17 | End: 2023-01-24

## 2022-07-28 NOTE — PROGRESS NOTES
[facility] Pulmonary Follow up    CHIEF COMPLAINT    dyspnea    HISTORY OF PRESENT ILLNESS    Leah Fuchs is a 41 y.o.female here today for follow-up.  She was last seen in the office by me in December.  She denies any respiratory illnesses since her last appointment.  She did have to be hospitalized at [facility] for altered mental status.  She had her medications adjusted and is back to her normal.    She does continue to follow with Dr. Anderson at  p[facility] clinic.  He would like to start her on Tyvaso and she would like to discuss this with me further before initiating the new medication.    She had been on Dulera twice a day and Incruse daily but has not used her inhalers in quite some time.  She does continue to have wheezing during the day.  She does not use her nebulizers regularly either.    She does wear her oxygen at 2 to 3 L continuously.  She does have shortness of breath with any exertion.    She continues to smoke 1 pack/day.  She has tried to quit but has been unsuccessful.    She denies fever, chills, sputum production, hemoptysis, night sweats, weight loss, chest pain or palpitations.  Denies any lower extremity edema or calf tenderness.  She denies any sinus or allergy symptoms.  She denies reflux symptoms.    She is up-to-date on her current vaccinations.  She is accompanied by her mother today.    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)   • Blues   • Dyslipidemia   • Acute deep vein thrombosis (DVT) of right upper extremity (HCC)   • Pulmonary hypertension (HCC)   • Chronic respiratory failure with hypoxia (HCC)       Allergies   Allergen Reactions   • Avelox [Moxifloxacin Hcl In Nacl] Shortness Of Breath     IV   • Tetracyclines &  Related Shortness Of Breath   • Benadryl [Diphenhydramine] Hives   • Demerol [Meperidine] Hives   • Tessalon [Benzonatate]        Current Outpatient Medications:   •  ARIPiprazole (ABILIFY) 2 MG tablet, Take one (1) tablet by mouth every morning, Disp: , Rfl:   •  atorvastatin (LIPITOR) 20 MG tablet, Take 20 mg by mouth Daily., Disp: , Rfl:   •  budesonide (Pulmicort) 0.25 MG/2ML nebulizer solution, Take 2 mL by nebulization 2 (Two) Times a Day., Disp: 60 mL, Rfl: 11  •  carvedilol (COREG) 12.5 MG tablet, 3.125 mg 2 (Two) Times a Day With Meals., Disp: , Rfl: 5  •  levocetirizine (XYZAL) 5 MG tablet, Take 1 each day, Disp: 30 tablet, Rfl: 10  •  megestrol (MEGACE) 40 MG tablet, Take 40 mg by mouth Daily., Disp: , Rfl:   •  methocarbamol (ROBAXIN) 500 MG tablet, Take 500 mg by mouth Every 6 (Six) Hours As Needed., Disp: , Rfl:   •  mometasone-formoterol (DULERA 200) 200-5 MCG/ACT inhaler, Inhale 2 puffs 2 (Two) Times a Day for 266 days., Disp: 39 g, Rfl: 3  •  pantoprazole (PROTONIX) 40 MG EC tablet, Take 1 tablet by mouth Daily., Disp: 90 tablet, Rfl: 1  •  sertraline (ZOLOFT) 100 MG tablet, Take one (1) tablet by mouth every morning, Disp: , Rfl:   •  Thiamine Mononitrate 100 MG tablet, Take 1 tablet by mouth Every Other Day., Disp: , Rfl:   •  Umeclidinium Bromide (Incruse Ellipta) 62.5 MCG/INH aerosol powder , Inhale 1 puff Daily., Disp: 30 each, Rfl: 11  •  venlafaxine XR (EFFEXOR-XR) 150 MG 24 hr capsule, Take 150 mg by mouth Daily., Disp: , Rfl:   •  XARELTO 20 MG tablet, Take 20 mg by mouth Daily. with food, Disp: , Rfl: 0  MEDICATION LIST AND ALLERGIES REVIEWED.    Social History     Tobacco Use   • Smoking status: Current Every Day Smoker     Packs/day: 1.00     Years: 15.00     Pack years: 15.00     Types: Cigarettes   • Smokeless tobacco: Never Used   Substance Use Topics   • Alcohol use: No   • Drug use: No       FAMILY AND SOCIAL HISTORY REVIEWED.    Review of Systems   Constitutional: Positive for  "fatigue. Negative for activity change, appetite change, fever and unexpected weight change.   HENT: Negative for congestion, postnasal drip, rhinorrhea, sinus pressure, sore throat and voice change.    Eyes: Negative for visual disturbance.   Respiratory: Positive for shortness of breath and wheezing. Negative for cough and chest tightness.    Cardiovascular: Negative for chest pain, palpitations and leg swelling.   Gastrointestinal: Negative for abdominal distention, abdominal pain, nausea and vomiting.   Endocrine: Negative for cold intolerance and heat intolerance.   Genitourinary: Negative for difficulty urinating and urgency.   Musculoskeletal: Negative for arthralgias, back pain and neck pain.   Skin: Negative for color change and pallor.   Allergic/Immunologic: Negative for environmental allergies and food allergies.   Neurological: Negative for dizziness, syncope, weakness and light-headedness.   Hematological: Negative for adenopathy. Does not bruise/bleed easily.   Psychiatric/Behavioral: Negative for agitation and behavioral problems.   .    /72   Pulse 74   Temp 97.8 °F (36.6 °C)   Ht 157.5 cm (62\")   Wt 49.9 kg (110 lb)   LMP  (LMP Unknown)   SpO2 98% Comment: resting, 2 liters pulse dose  Breastfeeding No   BMI 20.12 kg/m²       There is no immunization history on file for this patient.    Physical Exam  Vitals and nursing note reviewed.   Constitutional:       Appearance: She is well-developed. She is not diaphoretic.   HENT:      Head: Normocephalic and atraumatic.   Eyes:      Pupils: Pupils are equal, round, and reactive to light.   Neck:      Thyroid: No thyromegaly.   Cardiovascular:      Rate and Rhythm: Normal rate and regular rhythm.      Heart sounds: Normal heart sounds. No murmur heard.    No friction rub. No gallop.   Pulmonary:      Effort: Pulmonary effort is normal. No respiratory distress.      Breath sounds: Normal breath sounds. No wheezing or rales.   Chest:      Chest " wall: No tenderness.   Abdominal:      General: Bowel sounds are normal.      Palpations: Abdomen is soft.      Tenderness: There is no abdominal tenderness.   Musculoskeletal:         General: No swelling. Normal range of motion.      Cervical back: Normal range of motion and neck supple.   Lymphadenopathy:      Cervical: No cervical adenopathy.   Skin:     General: Skin is warm and dry.      Capillary Refill: Capillary refill takes less than 2 seconds.   Neurological:      Mental Status: She is alert and oriented to person, place, and time.   Psychiatric:         Mood and Affect: Mood normal.         Behavior: Behavior normal.           RESULTS    PROBLEM LIST    Problem List Items Addressed This Visit        Pulmonary and Pneumonias    Panlobular emphysema (HCC) - Primary    Pulmonary hypertension (HCC)    Chronic respiratory failure with hypoxia (HCC)       Tobacco    Cigarette nicotine dependence without complication            DISCUSSION    Ms. Fuchs was here for follow-up of her COPD.  She seems to be doing fairly well from a pulmonary standpoint.    Unfortunately she is not using her inhalers and I did encourage her to use these at least daily to see if that help with her breathing.  She states she can try to do them at least once a day for a while and see if that works better for her.  I did encourage her to use her nebulizers as needed for shortness of breath.    I did advise her that it was okay to start Tyvaso per Dr. Anderson's recommendations.  I will forward my note to him so that he knows that she can start the medication.  I did encourage her to start doing this regularly and not to miss any doses.    We did discuss smoking cessation in the office for 3 minutes.  She does not have a stop date planned currently.    She will follow-up in 6 months or sooner if her symptoms worsen.  Advised her to call with any additional concerns or questions.    Level of service justified based on 32 minutes spent  in patient care on this date of service including, but not limited to: preparing to see the patient, obtaining and/or reviewing history, performing medically appropriate examination, ordering tests/medicine/procedures, independently interpreting results, documenting clinical information in EHR, and counseling/education of patient/family/caregiver. (Level 4 30-39 minutes; Level 5 40-54 minutes)      Lynn Fraser, APRN  07/28/202209:18 EDT  Electronically signed     Please note that portions of this note were completed with a voice recognition program.        CC: Anthony eHrnández MD

## 2022-07-28 NOTE — TELEPHONE ENCOUNTER
Forward office notes to Dr Porter per pt's mother request so pt can start on Rx Tyvaso. Pt's mother advised to reach out to office and inform them that notes has been faxed and to have them fill this. Pt's mother verbalized understanding.

## 2023-01-24 ENCOUNTER — OFFICE VISIT (OUTPATIENT)
Dept: PULMONOLOGY | Facility: CLINIC | Age: 43
End: 2023-01-24
Payer: MEDICARE

## 2023-01-24 VITALS
RESPIRATION RATE: 16 BRPM | DIASTOLIC BLOOD PRESSURE: 66 MMHG | HEIGHT: 62 IN | SYSTOLIC BLOOD PRESSURE: 102 MMHG | WEIGHT: 127.4 LBS | OXYGEN SATURATION: 96 % | TEMPERATURE: 98 F | BODY MASS INDEX: 23.45 KG/M2 | HEART RATE: 95 BPM

## 2023-01-24 DIAGNOSIS — J42 BRONCHIOLITIS OBLITERANS: ICD-10-CM

## 2023-01-24 DIAGNOSIS — Z87.891 PERSONAL HISTORY OF SMOKING: ICD-10-CM

## 2023-01-24 DIAGNOSIS — J43.1 PANLOBULAR EMPHYSEMA: ICD-10-CM

## 2023-01-24 DIAGNOSIS — J47.9 BRONCHIECTASIS WITHOUT COMPLICATION: Primary | ICD-10-CM

## 2023-01-24 PROCEDURE — 99214 OFFICE O/P EST MOD 30 MIN: CPT | Performed by: NURSE PRACTITIONER

## 2023-01-24 RX ORDER — CARVEDILOL 3.12 MG/1
3.12 TABLET ORAL 2 TIMES DAILY
COMMUNITY
Start: 2023-01-04

## 2023-01-24 RX ORDER — VENLAFAXINE HYDROCHLORIDE 75 MG/1
200 CAPSULE, EXTENDED RELEASE ORAL DAILY
COMMUNITY
Start: 2022-08-31

## 2023-01-24 RX ORDER — TREPROSTINIL 64 UG/1
INHALANT ORAL
COMMUNITY
Start: 2022-11-14

## 2023-01-24 NOTE — PROGRESS NOTES
[facility] Follow up    CHIEF COMPLAINT    Dyspnea     HISTORY OF PRESENT ILLNESS    Leah Fuchs is a 42 y.o.female here today for follow-up.  She was last seen in the office by me in July.  She denies any respiratory illnesses since her last appointment.    She did quit smoking in October and has to be smoke-free for 6 months before being considered for the lung transplant.  She does follow closely with Dr. Anderson at [facility].    She has difficulty with taking her nebulizers on a regular basis.  She is trying to set a timer and remind herself to use her nebulizers regularly.    She is wearing 2 L continuously.  She is trying to stay active during the day but does have some shortness of breath with exertion.    She denies any sputum production or hemoptysis.  She denies any chest pain or palpitations.  She denies any lower extremity edema or calf tenderness.    She is accompanied today by her mother.  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)   • Blues   • Dyslipidemia   • Acute deep vein thrombosis (DVT) of right upper extremity (HCC)   • Pulmonary hypertension (HCC)   • Chronic respiratory failure with hypoxia (HCC)       Allergies   Allergen Reactions   • Avelox [Moxifloxacin Hcl In Nacl] Shortness Of Breath     IV   • Tetracyclines & Related Shortness Of Breath   • Benadryl [Diphenhydramine] Hives   • Demerol [Meperidine] Hives   • Tessalon [Benzonatate]        Current Outpatient Medications:   •  ARIPiprazole (ABILIFY) 2 MG tablet, Take one (1) tablet by mouth every morning, Disp: , Rfl:   •  atorvastatin (LIPITOR) 20 MG tablet, Take 20 mg by mouth Daily., Disp: , Rfl:   •  budesonide (Pulmicort) 0.25 MG/2ML nebulizer solution, Take 2 mL by nebulization 2 (Two) Times a Day., Disp:  60 mL, Rfl: 11  •  carvedilol (COREG) 3.125 MG tablet, Take 3.125 mg by mouth 2 (Two) Times a Day., Disp: , Rfl:   •  levocetirizine (XYZAL) 5 MG tablet, Take 1 each day, Disp: 30 tablet, Rfl: 10  •  megestrol (MEGACE) 40 MG tablet, Take 40 mg by mouth Daily., Disp: , Rfl:   •  pantoprazole (PROTONIX) 40 MG EC tablet, Take 1 tablet by mouth Daily., Disp: 90 tablet, Rfl: 1  •  sertraline (ZOLOFT) 100 MG tablet, Take one (1) tablet by mouth every morning, Disp: , Rfl:   •  Thiamine Mononitrate 100 MG tablet, Take 1 tablet by mouth Every Other Day., Disp: , Rfl:   •  Tyvaso DPI Maintenance Kit 64 MCG powder, , Disp: , Rfl:   •  venlafaxine XR (EFFEXOR-XR) 75 MG 24 hr capsule, Take 200 mg by mouth Daily., Disp: , Rfl:   •  XARELTO 20 MG tablet, Take 20 mg by mouth Daily. with food, Disp: , Rfl: 0  MEDICATION LIST AND ALLERGIES REVIEWED.    Social History     Tobacco Use   • Smoking status: Former     Packs/day: 1.00     Years: 15.00     Pack years: 15.00     Types: Cigarettes     Quit date: 10/1/2022     Years since quittin.3   • Smokeless tobacco: Never   Vaping Use   • Vaping Use: Never used   Substance Use Topics   • Alcohol use: No   • Drug use: Yes     Types: Oxycodone, Heroin     Comment: ; clean for 7 years       FAMILY AND SOCIAL HISTORY REVIEWED.    Review of Systems   Constitutional: Negative for activity change, appetite change, fatigue, fever and unexpected weight change.   HENT: Negative for congestion, postnasal drip, rhinorrhea, sinus pressure, sore throat and voice change.    Eyes: Negative for visual disturbance.   Respiratory: Positive for shortness of breath. Negative for cough, chest tightness and wheezing.    Cardiovascular: Negative for chest pain, palpitations and leg swelling.   Gastrointestinal: Negative for abdominal distention, abdominal pain, nausea and vomiting.   Endocrine: Negative for cold intolerance and heat intolerance.   Genitourinary: Negative for difficulty urinating and  "urgency.   Musculoskeletal: Negative for arthralgias, back pain and neck pain.   Skin: Negative for color change and pallor.   Allergic/Immunologic: Negative for environmental allergies and food allergies.   Neurological: Negative for dizziness, syncope, weakness and light-headedness.   Hematological: Negative for adenopathy. Does not bruise/bleed easily.   Psychiatric/Behavioral: Negative for agitation and behavioral problems.   .    /66 (BP Location: Left arm, Patient Position: Sitting, Cuff Size: Adult)   Pulse 95   Temp 98 °F (36.7 °C) (Infrared)   Resp 16   Ht 157.5 cm (62\")   Wt 57.8 kg (127 lb 6.4 oz)   LMP  (LMP Unknown)   SpO2 96% Comment: 2L; pulse dose  BMI 23.30 kg/m²       There is no immunization history on file for this patient.    Physical Exam  Vitals and nursing note reviewed.   Constitutional:       Appearance: She is well-developed. She is not diaphoretic.   HENT:      Head: Normocephalic and atraumatic.   Eyes:      Pupils: Pupils are equal, round, and reactive to light.   Neck:      Thyroid: No thyromegaly.   Cardiovascular:      Rate and Rhythm: Normal rate and regular rhythm.      Heart sounds: Normal heart sounds. No murmur heard.    No friction rub. No gallop.   Pulmonary:      Effort: Pulmonary effort is normal. No respiratory distress.      Breath sounds: Normal breath sounds. No wheezing or rales.   Chest:      Chest wall: No tenderness.   Abdominal:      General: Bowel sounds are normal.      Palpations: Abdomen is soft.      Tenderness: There is no abdominal tenderness.   Musculoskeletal:         General: No swelling. Normal range of motion.      Cervical back: Normal range of motion and neck supple.   Lymphadenopathy:      Cervical: No cervical adenopathy.   Skin:     General: Skin is warm and dry.      Capillary Refill: Capillary refill takes less than 2 seconds.   Neurological:      Mental Status: She is alert and oriented to person, place, and time.   Psychiatric:    "      Mood and Affect: Mood normal.         Behavior: Behavior normal.           RESULTS      PROBLEM LIST    Problem List Items Addressed This Visit        Pulmonary and Pneumonias    Bronchiectasis without complication (HCC) - Primary    Bronchiolitis obliterans (HCC)    Overview     Description: A.  With interstitial lung disease, diagnosed after open lung biopsy in 2007.  History of biopsy-proven in 2007         Panlobular emphysema (HCC)       Tobacco    Personal history of smoking         DISCUSSION    Ms. Fuchs was here for follow-up.  She seems to be doing well from a pulmonary standpoint.  I did congratulate her on quitting smoking.  Hopefully she will be able to qualify for her double lung transplant in the near future.  She has been smoke-free for 6 months before being Bre[facility] qualified.    She has a follow-up set up with Dr. Anderson in March.  She has been encouraged to take her Tyvaso daily.    Also encouraged her to use her budesonide nebulizers twice a day.  She will continue to wear her oxygen at 2 L continuously.    She will follow-up in 6 months with full PFTs or sooner if her symptoms worsen.  Advised her to call with any additional concerns or questions.    I personally spent a total of 31 minutes on patient visit today including chart review, face to face with the patient obtaining the history and physical exam, review of pertinent images and tests, counseling and discussion and/or coordination of care as described above, and documentation.  Total time excludes time spent on other separate services such as performing procedures or test interpretation, if applicable.        Lynn Fraser, APRN  01/24/202310:13 EST  Electronically signed     Please note that portions of this note were completed with a voice recognition program.        CC: Anthony Hernández MD

## 2023-07-25 ENCOUNTER — OFFICE VISIT (OUTPATIENT)
Dept: PULMONOLOGY | Facility: CLINIC | Age: 43
End: 2023-07-25
Payer: MEDICARE

## 2023-07-25 VITALS
SYSTOLIC BLOOD PRESSURE: 112 MMHG | TEMPERATURE: 98.4 F | HEIGHT: 62 IN | OXYGEN SATURATION: 98 % | DIASTOLIC BLOOD PRESSURE: 66 MMHG | BODY MASS INDEX: 27.02 KG/M2 | HEART RATE: 78 BPM | WEIGHT: 146.8 LBS

## 2023-07-25 DIAGNOSIS — J43.1 PANLOBULAR EMPHYSEMA: Primary | ICD-10-CM

## 2023-07-25 DIAGNOSIS — I27.20 PULMONARY HYPERTENSION: ICD-10-CM

## 2023-07-25 DIAGNOSIS — Z87.891 PERSONAL HISTORY OF SMOKING: ICD-10-CM

## 2023-07-25 PROCEDURE — 1159F MED LIST DOCD IN RCRD: CPT | Performed by: NURSE PRACTITIONER

## 2023-07-25 PROCEDURE — 94060 EVALUATION OF WHEEZING: CPT | Performed by: NURSE PRACTITIONER

## 2023-07-25 PROCEDURE — 94726 PLETHYSMOGRAPHY LUNG VOLUMES: CPT | Performed by: NURSE PRACTITIONER

## 2023-07-25 PROCEDURE — 1160F RVW MEDS BY RX/DR IN RCRD: CPT | Performed by: NURSE PRACTITIONER

## 2023-07-25 PROCEDURE — 94729 DIFFUSING CAPACITY: CPT | Performed by: NURSE PRACTITIONER

## 2023-07-25 PROCEDURE — 99214 OFFICE O/P EST MOD 30 MIN: CPT | Performed by: NURSE PRACTITIONER

## 2023-07-25 RX ORDER — MULTIPLE VITAMINS W/ MINERALS TAB 9MG-400MCG
1 TAB ORAL DAILY
COMMUNITY

## 2023-07-25 RX ORDER — OMEGA-3S/DHA/EPA/FISH OIL/D3 300MG-1000
400 CAPSULE ORAL DAILY
COMMUNITY

## 2023-07-25 RX ORDER — ALBUTEROL SULFATE 90 UG/1
4 AEROSOL, METERED RESPIRATORY (INHALATION) ONCE
Status: COMPLETED | OUTPATIENT
Start: 2023-07-25 | End: 2023-07-25

## 2023-07-25 RX ORDER — THIAMINE HCL 100 MG
TABLET ORAL DAILY
COMMUNITY

## 2023-07-25 RX ORDER — BUDESONIDE 0.25 MG/2ML
0.25 INHALANT ORAL 2 TIMES DAILY
Qty: 120 ML | Refills: 11 | Status: SHIPPED | OUTPATIENT
Start: 2023-07-25

## 2023-07-25 RX ADMIN — ALBUTEROL SULFATE 4 PUFF: 90 AEROSOL, METERED RESPIRATORY (INHALATION) at 10:35

## 2023-07-25 NOTE — PROGRESS NOTES
[facility] Pulmonary Follow up    CHIEF COMPLAINT    Dyspnea with heavy exertion    HISTORY OF PRESENT ILLNESS    Leah Fuchs is a 42 y.o.female here today for follow-up.  She was last seen in the office by me in January.  She denies any respiratory illnesses since her last appointment.    She continues to be smoke-free.  She has not smoked since October 2022.    She is also been able to wean off of her anxiety medications since her last appointment.  Her anxiety has improved and she is able to get out of the house and walk around stores.    She continues to wear 2 L continuously.  She denies any worsening shortness of breath with exertion.  She does sometimes have to take frequent breaks to recover.    She does not use her nebulizers or inhalers on a regular basis.  She only uses these as needed.    She has been on Tyvaso for her pulmonary hypertension but unfortunately this causes her more anxiety.  She wants to discuss switching to a different medication.    She has been on the list for a transplant in the past with  but unfortunately she was not smoke-free.  She is anxious to be back on the transplant list after there program has been restarted.    She denies sputum production or hemoptysis.  She denies any chest pain or palpitations.  Denies any lower extremity edema or calf tenderness.    She denies any reflux symptoms.  She does take Protonix regularly.  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    Blues    Dyslipidemia    Acute deep vein thrombosis (DVT) of right upper extremity    Pulmonary hypertension    Chronic respiratory failure with hypoxia       Allergies   Allergen Reactions    Avelox [Moxifloxacin Hcl In Nacl] Shortness Of Breath     IV    Tetracyclines & Related Shortness Of Breath    Benadryl [Diphenhydramine] Hives     Demerol [Meperidine] Hives    Tessalon [Benzonatate]        Current Outpatient Medications:     atorvastatin (LIPITOR) 20 MG tablet, Take 1 tablet by mouth Daily., Disp: , Rfl:     budesonide (Pulmicort) 0.25 MG/2ML nebulizer solution, Take 2 mL by nebulization 2 (Two) Times a Day., Disp: 120 mL, Rfl: 11    carvedilol (COREG) 3.125 MG tablet, Take 1 tablet by mouth 2 (Two) Times a Day., Disp: , Rfl:     Cholecalciferol 10 MCG (400 UNIT) tablet, Take 1 tablet by mouth Daily., Disp: , Rfl:     levocetirizine (XYZAL) 5 MG tablet, Take 1 each day, Disp: 30 tablet, Rfl: 10    megestrol (MEGACE) 40 MG tablet, Take 1 tablet by mouth Daily., Disp: , Rfl:     multivitamin with minerals tablet tablet, Take 1 tablet by mouth Daily., Disp: , Rfl:     pantoprazole (PROTONIX) 40 MG EC tablet, Take 1 tablet by mouth Daily., Disp: 90 tablet, Rfl: 1    Thiamine Mononitrate 100 MG tablet, Take 1 tablet by mouth Every Other Day., Disp: , Rfl:     Tyvaso DPI Maintenance Kit 64 MCG powder, , Disp: , Rfl:     vitamin B-12 (CYANOCOBALAMIN) 2500 MCG sublingual tablet tablet, Place  under the tongue Daily., Disp: , Rfl:     XARELTO 20 MG tablet, Take 1 tablet by mouth Daily. with food, Disp: , Rfl: 0  No current facility-administered medications for this visit.  MEDICATION LIST AND ALLERGIES REVIEWED.    Social History     Tobacco Use    Smoking status: Former     Packs/day: 1.00     Years: 15.00     Pack years: 15.00     Types: Cigarettes     Quit date: 10/1/2022     Years since quittin.8    Smokeless tobacco: Never   Vaping Use    Vaping Use: Never used   Substance Use Topics    Alcohol use: No    Drug use: Yes     Types: Oxycodone, Heroin     Comment: ; clean for 7 years       FAMILY AND SOCIAL HISTORY REVIEWED.    Review of Systems   Constitutional:  Negative for activity change, appetite change, fatigue, fever and unexpected weight change.   HENT:  Negative for congestion, postnasal drip, rhinorrhea, sinus pressure, sore throat and  "voice change.    Eyes:  Negative for visual disturbance.   Respiratory:  Positive for shortness of breath. Negative for cough, chest tightness and wheezing.    Cardiovascular:  Negative for chest pain, palpitations and leg swelling.   Gastrointestinal:  Negative for abdominal distention, abdominal pain, nausea and vomiting.   Endocrine: Negative for cold intolerance and heat intolerance.   Genitourinary:  Negative for difficulty urinating and urgency.   Musculoskeletal:  Negative for arthralgias, back pain and neck pain.   Skin:  Negative for color change and pallor.   Allergic/Immunologic: Negative for environmental allergies and food allergies.   Neurological:  Negative for dizziness, syncope, weakness and light-headedness.   Hematological:  Negative for adenopathy. Does not bruise/bleed easily.   Psychiatric/Behavioral:  Negative for agitation and behavioral problems.  .    /66 (BP Location: Right arm, Patient Position: Sitting, Cuff Size: Adult)   Pulse 78   Temp 98.4 °F (36.9 °C)   Ht 157.5 cm (62\")   Wt 66.6 kg (146 lb 12.8 oz)   LMP  (LMP Unknown)   SpO2 98%   PF (!) 2 L/min Comment: Intermittent  BMI 26.85 kg/m²       There is no immunization history on file for this patient.    Physical Exam  Vitals reviewed.   Constitutional:       Appearance: She is well-developed.   HENT:      Head: Normocephalic and atraumatic.   Eyes:      Pupils: Pupils are equal, round, and reactive to light.   Cardiovascular:      Rate and Rhythm: Normal rate and regular rhythm.      Heart sounds: Normal heart sounds.   Pulmonary:      Effort: Pulmonary effort is normal.      Breath sounds: Normal breath sounds. No wheezing or rales.   Chest:      Chest wall: No tenderness.   Abdominal:      General: Bowel sounds are normal.      Palpations: Abdomen is soft.   Musculoskeletal:         General: No swelling. Normal range of motion.      Cervical back: Normal range of motion and neck supple.   Skin:     General: Skin is " warm and dry.      Capillary Refill: Capillary refill takes less than 2 seconds.   Neurological:      Mental Status: She is alert and oriented to person, place, and time.   Psychiatric:         Mood and Affect: Mood normal.         Behavior: Behavior normal.         RESULTS    PFTS in the office today, read by me, FVC 1.84 54% predicted, FEV1 1.25 45% predicted, FEV1/FVC 68% predicted, TLC 3.51 76% predicted, DLCO 37% predicted, mild obstruction with no postbronchodilator response, mild restriction and severe diffusion.    PROBLEM LIST    Problem List Items Addressed This Visit          Pulmonary and Pneumonias    Panlobular emphysema - Primary    Relevant Medications    albuterol sulfate HFA (PROVENTIL HFA;VENTOLIN HFA;PROAIR HFA) inhaler 4 puff (Completed)    budesonide (Pulmicort) 0.25 MG/2ML nebulizer solution    Other Relevant Orders    Pulmonary Function Test (Completed)    Oxygen Therapy    Pulmonary hypertension       Tobacco    Personal history of smoking         DISCUSSION    Ms. Fuchs was here for follow-up of her emphysema.  She seems to doing well from a pulmonary standpoint.  We did review her PFTs in the office today and she has noticed an improvement in her FEV1 since her last testing.  She is currently only using her nebulizers as needed.  I did encourage her to continue these as needed.    She will continue to wear her oxygen at 2 L continuously.    I did encourage her to discuss with Dr. Justin Marsh.  I did advise her that she could switch to a different medication if he is agreeable.    I did congratulate her on quitting smoking and she quit in December 2022.  She has at least a 15-pack-year history.    We will have her follow-up in 6 months.    I personally spent a total of 33 minutes on patient visit today including chart review, face to face with the patient obtaining the history and physical exam, review of pertinent images and tests, counseling and discussion and/or coordination of  care as described above, and documentation.  Total time excludes time spent on other separate services such as performing procedures or test interpretation, if applicable.        Lynn Fraser, APRN  07/25/202314:48 EDT  Electronically signed     Please note that portions of this note were completed with a voice recognition program.        CC: Xiang Solano MD